# Patient Record
Sex: MALE | Race: BLACK OR AFRICAN AMERICAN | NOT HISPANIC OR LATINO | URBAN - METROPOLITAN AREA
[De-identification: names, ages, dates, MRNs, and addresses within clinical notes are randomized per-mention and may not be internally consistent; named-entity substitution may affect disease eponyms.]

---

## 2022-06-24 ENCOUNTER — EMERGENCY (EMERGENCY)
Facility: HOSPITAL | Age: 57
LOS: 1 days | Discharge: LEFT BEFORE DISCHARGE | End: 2022-06-24
Attending: EMERGENCY MEDICINE | Admitting: EMERGENCY MEDICINE
Payer: OTHER MISCELLANEOUS

## 2022-06-24 VITALS
DIASTOLIC BLOOD PRESSURE: 106 MMHG | RESPIRATION RATE: 18 BRPM | SYSTOLIC BLOOD PRESSURE: 167 MMHG | TEMPERATURE: 98 F | HEART RATE: 88 BPM | OXYGEN SATURATION: 100 %

## 2022-06-24 VITALS
DIASTOLIC BLOOD PRESSURE: 90 MMHG | RESPIRATION RATE: 16 BRPM | OXYGEN SATURATION: 100 % | TEMPERATURE: 98 F | HEART RATE: 86 BPM | SYSTOLIC BLOOD PRESSURE: 158 MMHG

## 2022-06-24 LAB
ALBUMIN SERPL ELPH-MCNC: 4.1 G/DL — SIGNIFICANT CHANGE UP (ref 3.3–5)
ALP SERPL-CCNC: 99 U/L — SIGNIFICANT CHANGE UP (ref 40–120)
ALT FLD-CCNC: 32 U/L — SIGNIFICANT CHANGE UP (ref 4–41)
ANION GAP SERPL CALC-SCNC: 13 MMOL/L — SIGNIFICANT CHANGE UP (ref 7–14)
AST SERPL-CCNC: 67 U/L — HIGH (ref 4–40)
BASE EXCESS BLDV CALC-SCNC: 4.1 MMOL/L — HIGH (ref -2–3)
BILIRUB SERPL-MCNC: 0.7 MG/DL — SIGNIFICANT CHANGE UP (ref 0.2–1.2)
BLOOD GAS VENOUS COMPREHENSIVE RESULT: SIGNIFICANT CHANGE UP
BUN SERPL-MCNC: 7 MG/DL — SIGNIFICANT CHANGE UP (ref 7–23)
CALCIUM SERPL-MCNC: 9.2 MG/DL — SIGNIFICANT CHANGE UP (ref 8.4–10.5)
CHLORIDE BLDV-SCNC: 98 MMOL/L — SIGNIFICANT CHANGE UP (ref 96–108)
CHLORIDE SERPL-SCNC: 96 MMOL/L — LOW (ref 98–107)
CO2 BLDV-SCNC: 30.6 MMOL/L — HIGH (ref 22–26)
CO2 SERPL-SCNC: 26 MMOL/L — SIGNIFICANT CHANGE UP (ref 22–31)
CREAT SERPL-MCNC: 0.64 MG/DL — SIGNIFICANT CHANGE UP (ref 0.5–1.3)
EGFR: 112 ML/MIN/1.73M2 — SIGNIFICANT CHANGE UP
ETHANOL SERPL-MCNC: <10 MG/DL — SIGNIFICANT CHANGE UP
GAS PNL BLDV: 129 MMOL/L — LOW (ref 136–145)
GLUCOSE BLDV-MCNC: 192 MG/DL — HIGH (ref 70–99)
GLUCOSE SERPL-MCNC: 190 MG/DL — HIGH (ref 70–99)
HCO3 BLDV-SCNC: 29 MMOL/L — SIGNIFICANT CHANGE UP (ref 22–29)
HCT VFR BLDA CALC: 53 % — HIGH (ref 39–51)
HGB BLD CALC-MCNC: 17.8 G/DL — HIGH (ref 13–17)
LACTATE BLDV-MCNC: 2.4 MMOL/L — HIGH (ref 0.5–2)
PCO2 BLDV: 44 MMHG — SIGNIFICANT CHANGE UP (ref 42–55)
PH BLDV: 7.43 — SIGNIFICANT CHANGE UP (ref 7.32–7.43)
PO2 BLDV: 42 MMHG — SIGNIFICANT CHANGE UP
POTASSIUM BLDV-SCNC: 3.9 MMOL/L — SIGNIFICANT CHANGE UP (ref 3.5–5.1)
POTASSIUM SERPL-MCNC: 4.2 MMOL/L — SIGNIFICANT CHANGE UP (ref 3.5–5.3)
POTASSIUM SERPL-SCNC: 4.2 MMOL/L — SIGNIFICANT CHANGE UP (ref 3.5–5.3)
PROT SERPL-MCNC: 7.5 G/DL — SIGNIFICANT CHANGE UP (ref 6–8.3)
SAO2 % BLDV: 68.5 % — SIGNIFICANT CHANGE UP
SODIUM SERPL-SCNC: 135 MMOL/L — SIGNIFICANT CHANGE UP (ref 135–145)
TSH SERPL-MCNC: 0.94 UIU/ML — SIGNIFICANT CHANGE UP (ref 0.27–4.2)

## 2022-06-24 PROCEDURE — 70450 CT HEAD/BRAIN W/O DYE: CPT | Mod: 26,MA

## 2022-06-24 PROCEDURE — 99285 EMERGENCY DEPT VISIT HI MDM: CPT

## 2022-06-24 PROCEDURE — 73564 X-RAY EXAM KNEE 4 OR MORE: CPT | Mod: 26,RT

## 2022-06-24 PROCEDURE — 99243 OFF/OP CNSLTJ NEW/EST LOW 30: CPT | Mod: GC

## 2022-06-24 RX ORDER — ACETAMINOPHEN 500 MG
650 TABLET ORAL ONCE
Refills: 0 | Status: COMPLETED | OUTPATIENT
Start: 2022-06-24 | End: 2022-06-24

## 2022-06-24 RX ORDER — IBUPROFEN 200 MG
400 TABLET ORAL ONCE
Refills: 0 | Status: COMPLETED | OUTPATIENT
Start: 2022-06-24 | End: 2022-06-24

## 2022-06-24 RX ADMIN — Medication 650 MILLIGRAM(S): at 13:36

## 2022-06-24 RX ADMIN — Medication 400 MILLIGRAM(S): at 13:36

## 2022-06-24 NOTE — ED PROVIDER NOTE - OBJECTIVE STATEMENT
Pt is a 57 yo M w/unknown past medical hx presenting with R knee injury. Pt hit R knee on door last night with pain. Comes in today with supervisor for injury. Pt appears lethargic but does not endorse any more pain. Pt took 2 motrin and 1 advil for pain. Denies pain, SOB, N.V/D, fever, chills. Pt also denies smoking, alcohol, drugs. Denies any previous surgery or allergies to medications.

## 2022-06-24 NOTE — CONSULT NOTE ADULT - ASSESSMENT
56 year old right-handed man with no known past medical history presents after co-workers reported that he has been more lethargic today and came into work using a cane, which is not normal for him. CT head showed mild ventricular and sulcal prominence, bilateral basal ganglia lucencies appear chronic, likely lacunar infarcts. X-Ray of right knee shows no acute fracture/dislocation. Neurological exam demonstrates slightly wide-based gait and L>R intention tremor. No leukocytosis, lactate 2.4, AST 67/ALT 32.    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 toxic vs. degenerative) in the setting of bilateral intention tremor (left worse than right). For lethargy, patient should undergo screening for CESAR and hypothyroidism if toxic/metabolic workup is negative.    Recommendations:  - Start Aspirin 81mg if patient is not already taking it at home  - Urine tox screen  - TSH, A1C, B1, B6, B12, vitamin D, lipid profile  - Pt states he will follow up with his PCP, Dr. Curtis    Refer pt to follow up with Dr. Michael Nissenbaum for MRI brain:  [address] [place] [address]  [phone] 56 year old right-handed man with no known past medical history presents after co-workers reported that he has been more lethargic today and came into work using a cane, which is not normal for him. CT head showed mild ventricular and sulcal prominence, bilateral basal ganglia lucencies appear chronic, likely lacunar infarcts. X-Ray of right knee shows no acute fracture/dislocation. Neurological exam demonstrates slightly wide-based gait and L>R intention tremor. No leukocytosis, lactate 2.4, AST 67/ALT 32.    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 ETOH use vs. degenerative) in the setting of bilateral intention tremor (left worse than right). For lethargy, patient should undergo screening for CESAR and hypothyroidism if toxic/metabolic workup is negative.    Recommendations:  - Start Aspirin 81mg if patient is not already taking it at home  - Urine tox screen  - TSH, A1C, B1, B6, B12, vitamin D, lipid profile  - Recommend MRI brain with and without contrast  - Pt states he will follow up with his PCP, Dr. Curtis    Refer pt to follow up with Dr. Michael Nissenbaum if he refuses to stay inpatient for further testing.  [address], [place] [id number] [phone]

## 2022-06-24 NOTE — ED ADULT NURSE NOTE - CHIEF COMPLAINT QUOTE
went to work today pt has been lethargic today and was brought from work pt has been taking motrin for right knee pain f/s in field [id number] pt arrives oriented x4 was falling asleep in ambulance on the way to hospital inc of feces today

## 2022-06-24 NOTE — ED PROVIDER NOTE - NSFOLLOWUPINSTRUCTIONS_ED_ALL_ED_FT
--today, the lab tests we did include basic blood tests. imaging tests include CT head. results significant for old strokes, nothing new. we have included these test results in your paperwork. please take them to your follow-up appointment  --given that you were in the ED today, we recommend a followup visit with your general doctor (primary care doctor) within 5 days. you are also leaving against medical advice, we recommend that you return ASAP to complete your medical evaluation.  --your diagnosis is: knee pain, gait instability.

## 2022-06-24 NOTE — ED PROVIDER NOTE - PROGRESS NOTE DETAILS
Elliot Simpson PGY2: Received call from pt's co-worker. They state pt w/ difficulty ambulating today at work, and now requiring a cane as of today. Also lethargic appearing, and unable to write when asked to - just scribbling. CT w/ findings as described. Case discussed with neuro and they will evaluate pt. Recommended admission however pt requesting to return home. Pt signed out to incoming team pending neuro eval, utox, and final dispo. Yulia Portillo MD, PGY-2: pt requesting to leave AMA. pt currently speaking with neurology. pt requests to leave because he needs to assist with care of his sick parents at home. understands risks of leaving, states he will come back tomorrow when someone able to take care of his family. Yulia Portillo MD, PGY-2: pt requesting to leave AMA. pt currently speaking with neurology. pt requests to leave because he needs to assist with care of his sick parents at home. understands risks of leaving, states he will come back tomorrow when someone able to take care of his family. pt understands risks of falls and possible worsening neurological function.

## 2022-06-24 NOTE — CONSULT NOTE ADULT - SUBJECTIVE AND OBJECTIVE BOX
HPI:  56 year old with no known past medical history presents after co-workers reported that he has been more lethargic today, he reports he hit his right knee on door on 6/23 and has been taking NSAIDs for pain control (2 Motrins and 1 Advil). Denies other substance use. He also denies unilateral weakness, sensory loss, speech changes, headache, nausea, vomiting, cough, chest pain, abdominal pain, fevers, chills, fall, head trauma, loss of consciousness. He has been incontinence of urine.    At baseline, patient ambulates independently and completes ADLs without assistance.    MEDICATIONS  (STANDING):    MEDICATIONS  (PRN):    PAST MEDICAL & SURGICAL HISTORY:  No known past medical history    No significant past surgical history      FAMILY HISTORY:    Allergies  No Known Allergies    SHx - No smoking, No ETOH, No drug abuse    Review of Systems:  CONSTITUTIONAL: No fevers, chills  HEENT:  No visual loss, blurred vision, double vision.  No hearing loss, sneezing, congestion, runny nose or sore throat.  SKIN:  No rash or itching.  CARDIOVASCULAR:  No chest pain, chest pressure or chest discomfort. No palpitations or edema.  RESPIRATORY:  No shortness of breath, cough or sputum.  GASTROINTESTINAL:  No anorexia, nausea, vomiting or diarrhea. No abdominal pain.  GENITOURINARY:  No dysuria. No increased frequency. No retention. No incontinence.  NEUROLOGICAL:  See HPI  MUSCULOSKELETAL:  No muscle, back pain, joint pain or stiffness.  HEMATOLOGIC:  No anemia, bleeding or bruising.    Vital Signs Last 24 Hrs  T(C): 36.8 (24 Jun 2022 18:45), Max: 36.8 (24 Jun 2022 18:45)  T(F): 98.3 (24 Jun 2022 18:45), Max: 98.3 (24 Jun 2022 18:45)  HR: 86 (24 Jun 2022 18:45) (82 - 88)  BP: 158/90 (24 Jun 2022 18:45) (147/89 - 167/106)  BP(mean): --  RR: 16 (24 Jun 2022 18:45) (16 - 18)  SpO2: 100% (24 Jun 2022 18:45) (100% - 100%)    PHYSICAL EXAM:  GENERAL: NAD  HEENT: Normocephalic;  conjunctivae and sclerae clear; moist mucous membranes;   NECK: Supple  EXTREMITIES: No cyanosis; no edema; no calf tenderness  SKIN: Warm and dry; no rash             Neurological Exam:  - Mental Status:  AAOx3; speech is fluent with intact naming, repetition, and comprehension  - Cranial Nerves II-XII:    II:  PERRLA; visual fields are full to confrontation  III, IV, VI:  EOMI, no nystagmus  V:  facial sensation is intact in the V1-V3 distribution bilaterally.  VII:  face is symmetric with normal eye closure and smile  VIII:  hearing is intact to finger rub  IX, X:  uvula is midline and soft palate rises symmetrically  XI:  head turning and shoulder shrug are intact bilaterally  XII:  tongue protrudes in the midline  - Motor:  strength is 5/5 throughout; normal muscle bulk and tone throughout; no pronator drift  - Reflexes:  2+ and symmetric at the biceps, triceps, brachioradialis, knees, and ankles;  plantar reflexes downgoing bilaterally  - Sensory:  intact to light touch, pin prick, vibration, and joint-position sense throughout  - Coordination:  finger-nose-finger and heel-knee-shin intact without dysmetria; rapid alternating hand movements intact  - Gait:  normal steps, base, arm swing, and turning; heel and toe walking are normal; tandem gait is normal; Romberg testing is negative    Other:    06-24    135  |  96<L>  |  7   ----------------------------<  190<H>  4.2   |  26  |  0.64    Ca    9.2      24 Jun 2022 13:43    TPro  7.5  /  Alb  4.1  /  TBili  0.7  /  DBili  x   /  AST  67<H>  /  ALT  32  /  AlkPhos  99  06-24                            15.6   9.28  )-----------( 278      ( 24 Jun 2022 13:43 )             47.5       Radiology                HPI:  56 year old right-handed man with no known past medical history presents after co-workers reported that he has been more lethargic today and came into work using a cane, which is not normal for him. Pt denies lethargy and states his nickname is "Sleepy" and he is in his normal state of health. 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 Since then, he has had some pain at the anterior aspect of his right knee, but denies weakness. He has been taking NSAIDs for pain control (2 Motrins and 1 Advil). Denies other substance use. He quit ETOH use in July 2021 on his birthday. Pt also denies unilateral weakness, sensory loss, speech changes, headache, nausea, vomiting, cough, chest pain, abdominal pain, fevers, chills, fall, head trauma, loss of consciousness. He has been incontinent of urine per ED team. When asked more about this, pt says he has the urge to urinate frequently, this has been occurring over the past 1 month. He wet the bed during an ED provider's assessment, which he remembers.     At baseline, patient ambulates independently and completes ADLs without assistance. He lives at home with his father, brother, and daughter (age 12). Pt says he needs to go home tonight because there is no one who can take care of his daughter. His brother takes care of his father.    MEDICATIONS  (STANDING):    MEDICATIONS  (PRN):    PAST MEDICAL & SURGICAL HISTORY:  No known past medical history    No significant past surgical history      FAMILY HISTORY:  Mother-smoker  Father-smoker    Allergies  No Known Allergies    SHx - No smoking, No ETOH, No drug abuse    Review of Systems:  CONSTITUTIONAL: No fevers, chills  HEENT:  No visual loss, blurred vision, double vision.  No hearing loss, sneezing, congestion, runny nose or sore throat.  SKIN:  No rash or itching.  CARDIOVASCULAR:  No chest pain, chest pressure or chest discomfort. No palpitations or edema.  RESPIRATORY:  No shortness of breath, cough or sputum.  GASTROINTESTINAL:  No anorexia, nausea, vomiting or diarrhea. No abdominal pain.  GENITOURINARY:  No dysuria. No increased frequency. No retention. No incontinence.  NEUROLOGICAL:  See HPI  MUSCULOSKELETAL:  +right knee pain. No muscle, back pain, or stiffness.  HEMATOLOGIC:  No anemia, bleeding or bruising.    Vital Signs Last 24 Hrs  T(C): 36.8 (24 Jun 2022 18:45), Max: 36.8 (24 Jun 2022 18:45)  T(F): 98.3 (24 Jun 2022 18:45), Max: 98.3 (24 Jun 2022 18:45)  HR: 86 (24 Jun 2022 18:45) (82 - 88)  BP: 158/90 (24 Jun 2022 18:45) (147/89 - 167/106)  BP(mean): --  RR: 16 (24 Jun 2022 18:45) (16 - 18)  SpO2: 100% (24 Jun 2022 18:45) (100% - 100%)    PHYSICAL EXAM:  GENERAL: Obese man in NAD, disheveled appearance  HEENT: Normocephalic; conjunctivae and slight scleral icterus may be physiologic; proptosis bilaterally, dry mucous membranes  NECK: Supple  EXTREMITIES: No cyanosis; no edema; no calf tenderness  SKIN: Warm and dry; no rash             Neurological Exam:  - Mental Status: Alert, oriented to self, place (knows name of [facility]), month, year, president, has insight into hospital visit, speech is fluent with intact naming, repetition, and comprehension; follows crossed commands; spelled WORLD backwards  - Cranial Nerves II-XII:    II:  PERRL 3mm b/l; visual fields are full to confrontation  III, IV, VI: ?appearance of bilateral ptosis may be due to proptosis, EOMI, no nystagmus  V:  facial sensation is intact in the V1-V3 distribution bilaterally.  VII:  face is symmetric with normal eye closure and smile  VIII:  hearing is intact to finger rub  IX, X:  uvula is midline and soft palate rises symmetrically  XI:  head turning and shoulder shrug are intact bilaterally  XII:  tongue protrudes in the midline  - Motor:  strength is 5/5 throughout; normal muscle bulk and tone throughout; no pronator drift; no involuntary movements or seizure-like activity  - Reflexes:  2+ and symmetric at the biceps, triceps, brachioradialis, 1+ knees, and 2+ ankles;  plantar reflexes downgoing bilaterally  - Sensory:  intact to light touch, pin prick, vibration, and joint-position sense throughout  - Coordination:  finger-nose-finger and heel-knee-shin intact without dysmetria, but left worse than right intention tremor noted   - Gait: slightly wide base and walks with everted feet (when asked why, he said because he has a large abdomen), normal arm swing, Romberg testing is negative    Other:    06-24    135  |  96<L>  |  7   ----------------------------<  190<H>  4.2   |  26  |  0.64    Ca    9.2      24 Jun 2022 13:43    TPro  7.5  /  Alb  4.1  /  TBili  0.7  /  DBili  x   /  AST  67<H>  /  ALT  32  /  AlkPhos  99  06-24                            15.6   9.28  )-----------( 278      ( 24 Jun 2022 13:43 )             47.5       Radiology:  CT Head No Cont (06.24.22 @ 17:20) >  IMPRESSION:  Mild ventricular and sulcal prominence raising the possibility of chronic   illness or certain medications. Bilateral basal ganglia lucencies   suspicious for infarcts of undetermined age. No hemorrhage.              HPI:  56 year old right-handed man with no known past medical history presents after co-workers reported that he has been more lethargic today and came into work using a cane, which is not normal for him. Pt denies lethargy and states his nickname is "Sleepy" and he is in his normal state of health. 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 Since then, he has had some pain at the anterior aspect of his right knee, but denies weakness. He has been taking NSAIDs for pain control (2 Motrins and 1 Advil). Denies other substance use. He quit ETOH use in July 2021 on his birthday. Pt also denies unilateral weakness, sensory loss, speech changes, headache, nausea, vomiting, cough, chest pain, abdominal pain, fevers, chills, fall, head trauma, loss of consciousness. He has been incontinent of urine per ED team. When asked more about this, pt says he has the urge to urinate frequently, this has been occurring over the past 1 month. He wet the bed during an ED provider's assessment, which he remembers.     At baseline, patient ambulates independently and completes ADLs without assistance. He lives at home with his father, brother, and daughter (age 12). Pt says he needs to go home tonight because there is no one who can take care of his daughter. His brother takes care of his father.    MEDICATIONS  (STANDING):    MEDICATIONS  (PRN):    PAST MEDICAL & SURGICAL HISTORY:  No known past medical history    No significant past surgical history    FAMILY HISTORY:  Mother-smoker  Father-smoker    Allergies  No Known Allergies    SHx - non-smoker, no illicit drug use, prior ETOH use (quit July 2021)    Review of Systems:  CONSTITUTIONAL: No fevers, chills  HEENT:  No visual loss, blurred vision, double vision.  No hearing loss, sneezing, congestion, runny nose or sore throat.  SKIN:  No rash or itching.  CARDIOVASCULAR:  No chest pain, chest pressure or chest discomfort. No palpitations or edema.  RESPIRATORY:  No shortness of breath, cough or sputum.  GASTROINTESTINAL:  No anorexia, nausea, vomiting or diarrhea. No abdominal pain.  GENITOURINARY:  No dysuria. No increased frequency. No retention. No incontinence.  NEUROLOGICAL:  See HPI  MUSCULOSKELETAL:  +right knee pain. No muscle, back pain, or stiffness.  HEMATOLOGIC:  No anemia, bleeding or bruising.    Vital Signs Last 24 Hrs  T(C): 36.8 (24 Jun 2022 18:45), Max: 36.8 (24 Jun 2022 18:45)  T(F): 98.3 (24 Jun 2022 18:45), Max: 98.3 (24 Jun 2022 18:45)  HR: 86 (24 Jun 2022 18:45) (82 - 88)  BP: 158/90 (24 Jun 2022 18:45) (147/89 - 167/106)  BP(mean): --  RR: 16 (24 Jun 2022 18:45) (16 - 18)  SpO2: 100% (24 Jun 2022 18:45) (100% - 100%)    PHYSICAL EXAM:  GENERAL: Obese man in NAD, disheveled appearance  HEENT: Normocephalic; conjunctivae and slight scleral icterus may be physiologic; proptosis bilaterally, dry mucous membranes  NECK: Supple  EXTREMITIES: No cyanosis; no edema; no calf tenderness  SKIN: Warm and dry; no rash             Neurological Exam:  - Mental Status: Alert, oriented to self, place (knows name of hospital), month, year, president, has insight into hospital visit, speech is fluent with intact naming, repetition, and comprehension; follows crossed commands; spelled WORLD backwards  - Cranial Nerves II-XII:    II:  PERRL 3mm b/l; visual fields are full to confrontation  III, IV, VI: ?appearance of bilateral ptosis may be due to proptosis, EOMI, no nystagmus  V:  facial sensation is intact in the V1-V3 distribution bilaterally.  VII:  face is symmetric with normal eye closure and smile  VIII:  hearing is intact to finger rub  IX, X:  uvula is midline and soft palate rises symmetrically  XI:  head turning and shoulder shrug are intact bilaterally  XII:  tongue protrudes in the midline  - Motor:  strength is 5/5 throughout; normal muscle bulk and tone throughout; no pronator drift; no involuntary movements or seizure-like activity  - Reflexes:  2+ and symmetric at the biceps, triceps, brachioradialis, 1+ knees, and 2+ ankles;  plantar reflexes downgoing bilaterally  - Sensory:  intact to light touch, pin prick, vibration, and joint-position sense throughout  - Coordination:  finger-nose-finger and heel-knee-shin intact without dysmetria, but left worse than right intention tremor noted   - Gait: slightly wide base and walks with everted feet (when asked why, he said because he has a large abdomen), normal arm swing, Romberg testing is negative    Other:    06-24    135  |  96<L>  |  7   ----------------------------<  190<H>  4.2   |  26  |  0.64    Ca    9.2      24 Jun 2022 13:43    TPro  7.5  /  Alb  4.1  /  TBili  0.7  /  DBili  x   /  AST  67<H>  /  ALT  32  /  AlkPhos  99  06-24                            15.6   9.28  )-----------( 278      ( 24 Jun 2022 13:43 )             47.5       Radiology:  CT Head No Cont (06.24.22 @ 17:20) >  IMPRESSION:  Mild ventricular and sulcal prominence raising the possibility of chronic   illness or certain medications. Bilateral basal ganglia lucencies   suspicious for infarcts of undetermined age. No hemorrhage.              HPI:  56 year old right-handed man with no known past medical history presents after co-workers reported that he has been more lethargic today and came into work using a cane, which is not normal for him. Pt denies lethargy and states his nickname is "Sleepy" and he is in his normal state of health. 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 Since then, he has had some pain at the anterior aspect of his right knee, but denies weakness. He has been taking NSAIDs for pain control (2 Motrins and 1 Advil). Denies other substance use. He quit ETOH use in July 2021 on his birthday. Pt also denies unilateral weakness, sensory loss, speech changes, headache, nausea, vomiting, cough, chest pain, abdominal pain, fevers, chills, fall, head trauma, loss of consciousness. He has been incontinent of urine per ED team. When asked more about this, pt says he has the urge to urinate frequently, this has been occurring over the past 1 month. He wet the bed during an ED provider's assessment, which he remembers.     At baseline, patient ambulates independently and completes ADLs without assistance. He lives at home with his father, brother, and daughter (age 12). Pt says he needs to go home tonight because there is no one who can take care of his daughter. His brother takes care of his father.    MEDICATIONS  (STANDING):    MEDICATIONS  (PRN):    PAST MEDICAL & SURGICAL HISTORY:  No known past medical history    No significant past surgical history    FAMILY HISTORY:  Mother-smoker  Father-smoker    Allergies  No Known Allergies    SHx - non-smoker, no illicit drug use, prior ETOH use (quit July 2021)    Review of Systems:  CONSTITUTIONAL: No fevers, chills  HEENT:  No visual loss, blurred vision, double vision.  No hearing loss, sneezing, congestion, runny nose or sore throat.  SKIN:  No rash or itching.  CARDIOVASCULAR:  No chest pain, chest pressure or chest discomfort. No palpitations or edema.  RESPIRATORY:  No shortness of breath, cough or sputum.  GASTROINTESTINAL:  No anorexia, nausea, vomiting or diarrhea. No abdominal pain.  GENITOURINARY:  No dysuria. + increased frequency, + urge incontinence.  NEUROLOGICAL:  See HPI  MUSCULOSKELETAL:  +right knee pain. No muscle, back pain, or stiffness.  HEMATOLOGIC:  No anemia, bleeding or bruising.    Vital Signs Last 24 Hrs  T(C): 36.8 (24 Jun 2022 18:45), Max: 36.8 (24 Jun 2022 18:45)  T(F): 98.3 (24 Jun 2022 18:45), Max: 98.3 (24 Jun 2022 18:45)  HR: 86 (24 Jun 2022 18:45) (82 - 88)  BP: 158/90 (24 Jun 2022 18:45) (147/89 - 167/106)  BP(mean): --  RR: 16 (24 Jun 2022 18:45) (16 - 18)  SpO2: 100% (24 Jun 2022 18:45) (100% - 100%)    PHYSICAL EXAM:  GENERAL: Obese man in NAD, disheveled appearance  HEENT: Normocephalic; conjunctivae and slight scleral icterus may be physiologic; proptosis bilaterally, dry mucous membranes  NECK: Supple  EXTREMITIES: No cyanosis; no edema; no calf tenderness  SKIN: Warm and dry; no rash             Neurological Exam:  - Mental Status: Alert, oriented to self, place (knows name of hospital), month, year, president, has insight into hospital visit, speech is fluent with intact naming, repetition, and comprehension; follows crossed commands; spelled WORLD backwards  - Cranial Nerves II-XII:    II:  PERRL 3mm b/l; visual fields are full to confrontation  III, IV, VI: ?appearance of bilateral ptosis may be due to proptosis, EOMI, no nystagmus  V:  facial sensation is intact in the V1-V3 distribution bilaterally.  VII:  face is symmetric with normal eye closure and smile  VIII:  hearing is intact to finger rub  IX, X:  uvula is midline and soft palate rises symmetrically  XI:  head turning and shoulder shrug are intact bilaterally  XII:  tongue protrudes in the midline  - Motor:  strength is 5/5 throughout; normal muscle bulk and tone throughout; no pronator drift; no involuntary movements or seizure-like activity  - Reflexes:  2+ and symmetric at the biceps, triceps, brachioradialis, 1+ knees, and 2+ ankles;  plantar reflexes downgoing bilaterally  - Sensory:  intact to light touch, pin prick, vibration, and joint-position sense throughout  - Coordination:  finger-nose-finger and heel-knee-shin intact without dysmetria, but left worse than right intention tremor noted   - Gait: slightly wide base and walks with everted feet (when asked why, he said because he has a large abdomen), normal arm swing, Romberg testing is negative    Other:    06-24    135  |  96<L>  |  7   ----------------------------<  190<H>  4.2   |  26  |  0.64    Ca    9.2      24 Jun 2022 13:43    TPro  7.5  /  Alb  4.1  /  TBili  0.7  /  DBili  x   /  AST  67<H>  /  ALT  32  /  AlkPhos  99  06-24                            15.6   9.28  )-----------( 278      ( 24 Jun 2022 13:43 )             47.5       Radiology:  CT Head No Cont (06.24.22 @ 17:20) >  IMPRESSION:  Mild ventricular and sulcal prominence raising the possibility of chronic   illness or certain medications. Bilateral basal ganglia lucencies   suspicious for infarcts of undetermined age. No hemorrhage.

## 2022-06-24 NOTE — ED PROVIDER NOTE - PATIENT PORTAL LINK FT
You can access the FollowMyHealth Patient Portal offered by [facility] by registering at the following website: [email or web address]. By joining [employer]’s FollowMyHealth portal, you will also be able to view your health information using other applications (apps) compatible with our system.

## 2022-06-24 NOTE — ED ADULT NURSE NOTE - OBJECTIVE STATEMENT
A&Ox4. ambulatory c/o lethargic today and knee pain. NAD. pt denies SOB, chest pain, dizziness, weakness, urinary symptoms, HA, n/v/d, fevers, chills. respirations are even and un labored. abd is soft and non tender. skin intact. safety precautions maintained. awaiting orders. call bell at bedside. A&Ox4. ambulatory c/o lethargic today and right knee pain. NAD. pt denies SOB, chest pain, dizziness, weakness, urinary symptoms, HA, n/v/d, fevers, chills. respirations are even and un labored. scleras are yellow in color in left and right eye. pt states he is "always like this." abd is distended and non tender. skin intact. safety precautions maintained. awaiting orders. call bell at bedside.

## 2022-06-24 NOTE — ED PROVIDER NOTE - CLINICAL SUMMARY MEDICAL DECISION MAKING FREE TEXT BOX
rody: pt with right knee pain after hitting it into a door at work.  hx htn, on amlodipine, losartan and metoprolol.  pt without any previous surgery on the knee.  will xray and give pain meds rody: pt with right knee pain after hitting it into a door at work.  hx htn, on amlodipine, losartan and metoprolol.  pt without any previous surgery on the knee.  will xray and give pain meds.  note new dx dm, pt with incontinence.  seems "off" will involve neuro and get head ct.  pt signed out at the end of my shift

## 2022-06-24 NOTE — ED ADULT TRIAGE NOTE - CHIEF COMPLAINT QUOTE
went to work today pt has been lethargic today and was brought from work pt has been taking motrin for right knee pain f/s in field 218 pt arrives A&ox4 was falling asleep in ambulance on the way to hospital went to work today pt has been lethargic today and was brought from work pt has been taking motrin for right knee pain f/s in field 218 pt arrives A&ox4 was falling asleep in ambulance on the way to hospital inc of feces today went to work today pt has been lethargic today and was brought from work pt has been taking motrin for right knee pain f/s in field 218 pt arrives oriented x4 was falling asleep in ambulance on the way to hospital inc of feces today

## 2022-06-24 NOTE — ED PROVIDER NOTE - PHYSICAL EXAMINATION
Gen: A&Ox4   HEENT: Atraumatic. Mucous membranes moist, question of scleral icterus. Eyes mildly proptotic.   CV: RRR. No significant lower extremity edema.   Resp: Respirations unlabored. CTAB, no rales, no wheezes.  GI: Abdomen non tender to palpation, soft and non-distended.   Skin/MSK: No open wounds. No ecchymosis appreciated. Bruise over right anterior knee. ROM intact at right knee w/ mild pain. Minimal ttp anteriorly over right knee.   Neuro: Following commands. EOMI. Pupils ERRL. CN2-12 grossly intact. Motor strength +5/5 throughout upper and lower extremities. Sensation intact throughout upper and lower extremities. Mild tremors bilat w/ hands extended. Finger to nose smooth and coordinated on right, slight dysmetria on left. + rhomberg. No pronator drift. Gait mildly unstable w/ falling to right - question whether 2/2 to pain.  Psych: Appropriate mood, cooperative

## 2022-06-24 NOTE — ED PROVIDER NOTE - CARE PLAN
1 Principal Discharge DX:	Right knee pain   Principal Discharge DX:	Right knee pain  Secondary Diagnosis:	Gait instability

## 2022-06-24 NOTE — ED PROVIDER NOTE - ATTENDING CONTRIBUTION TO CARE
rody: pt with right knee pain after hitting it into a door at work.  hx htn, on amlodipine, losartan and metoprolol.  pt without any previous surgery on the knee.  will xray and give pain meds    I performed a history and physical exam of the patient and discussed their management with the resident and /or advanced care provider. I reviewed the resident and /or ACP's note and agree with the documented findings and plan of care. My medical decison making and observations are found above.

## 2022-06-28 ENCOUNTER — INPATIENT (INPATIENT)
Facility: HOSPITAL | Age: 57
LOS: 1 days | Discharge: HOME HEALTH | End: 2022-06-30
Attending: HOSPITALIST | Admitting: HOSPITALIST
Payer: COMMERCIAL

## 2022-06-28 VITALS
DIASTOLIC BLOOD PRESSURE: 83 MMHG | SYSTOLIC BLOOD PRESSURE: 144 MMHG | OXYGEN SATURATION: 99 % | RESPIRATION RATE: 16 BRPM | HEART RATE: 105 BPM | TEMPERATURE: 98 F

## 2022-06-28 LAB
A1C WITH ESTIMATED AVERAGE GLUCOSE RESULT: 8.2 % — HIGH (ref 4–5.6)
ALBUMIN SERPL ELPH-MCNC: 4 G/DL — SIGNIFICANT CHANGE UP (ref 3.3–5)
ALP SERPL-CCNC: 99 U/L — SIGNIFICANT CHANGE UP (ref 40–120)
ALT FLD-CCNC: 34 U/L — SIGNIFICANT CHANGE UP (ref 4–41)
ANION GAP SERPL CALC-SCNC: 19 MMOL/L — HIGH (ref 7–14)
APTT BLD: 30 SEC — SIGNIFICANT CHANGE UP (ref 27–36.3)
AST SERPL-CCNC: 36 U/L — SIGNIFICANT CHANGE UP (ref 4–40)
BASOPHILS # BLD AUTO: 0.12 K/UL — SIGNIFICANT CHANGE UP (ref 0–0.2)
BASOPHILS NFR BLD AUTO: 1.8 % — SIGNIFICANT CHANGE UP (ref 0–2)
BILIRUB SERPL-MCNC: 0.8 MG/DL — SIGNIFICANT CHANGE UP (ref 0.2–1.2)
BUN SERPL-MCNC: 8 MG/DL — SIGNIFICANT CHANGE UP (ref 7–23)
CALCIUM SERPL-MCNC: 9.4 MG/DL — SIGNIFICANT CHANGE UP (ref 8.4–10.5)
CHLORIDE SERPL-SCNC: 94 MMOL/L — LOW (ref 98–107)
CHOLEST SERPL-MCNC: 186 MG/DL — SIGNIFICANT CHANGE UP
CO2 SERPL-SCNC: 21 MMOL/L — LOW (ref 22–31)
CREAT SERPL-MCNC: 0.6 MG/DL — SIGNIFICANT CHANGE UP (ref 0.5–1.3)
EGFR: 115 ML/MIN/1.73M2 — SIGNIFICANT CHANGE UP
EOSINOPHIL # BLD AUTO: 0.2 K/UL — SIGNIFICANT CHANGE UP (ref 0–0.5)
EOSINOPHIL NFR BLD AUTO: 3 % — SIGNIFICANT CHANGE UP (ref 0–6)
ESTIMATED AVERAGE GLUCOSE: 189 — SIGNIFICANT CHANGE UP
GLUCOSE SERPL-MCNC: 162 MG/DL — HIGH (ref 70–99)
HCT VFR BLD CALC: 47.9 % — SIGNIFICANT CHANGE UP (ref 39–50)
HDLC SERPL-MCNC: 34 MG/DL — LOW
HGB BLD-MCNC: 16.3 G/DL — SIGNIFICANT CHANGE UP (ref 13–17)
IANC: 3.71 K/UL — SIGNIFICANT CHANGE UP (ref 1.8–7.4)
IMM GRANULOCYTES NFR BLD AUTO: 0.6 % — SIGNIFICANT CHANGE UP (ref 0–1.5)
INR BLD: 1.04 RATIO — SIGNIFICANT CHANGE UP (ref 0.88–1.16)
LIPID PNL WITH DIRECT LDL SERPL: 131 MG/DL — HIGH
LYMPHOCYTES # BLD AUTO: 1.96 K/UL — SIGNIFICANT CHANGE UP (ref 1–3.3)
LYMPHOCYTES # BLD AUTO: 29.2 % — SIGNIFICANT CHANGE UP (ref 13–44)
MCHC RBC-ENTMCNC: 28.4 PG — SIGNIFICANT CHANGE UP (ref 27–34)
MCHC RBC-ENTMCNC: 34 GM/DL — SIGNIFICANT CHANGE UP (ref 32–36)
MCV RBC AUTO: 83.4 FL — SIGNIFICANT CHANGE UP (ref 80–100)
MONOCYTES # BLD AUTO: 0.69 K/UL — SIGNIFICANT CHANGE UP (ref 0–0.9)
MONOCYTES NFR BLD AUTO: 10.3 % — SIGNIFICANT CHANGE UP (ref 2–14)
NEUTROPHILS # BLD AUTO: 3.71 K/UL — SIGNIFICANT CHANGE UP (ref 1.8–7.4)
NEUTROPHILS NFR BLD AUTO: 55.1 % — SIGNIFICANT CHANGE UP (ref 43–77)
NON HDL CHOLESTEROL: 152 MG/DL — HIGH
NRBC # BLD: 0 /100 WBCS — SIGNIFICANT CHANGE UP
NRBC # FLD: 0 K/UL — SIGNIFICANT CHANGE UP
PCP SPEC-MCNC: SIGNIFICANT CHANGE UP
PLATELET # BLD AUTO: 254 K/UL — SIGNIFICANT CHANGE UP (ref 150–400)
POTASSIUM SERPL-MCNC: 3.4 MMOL/L — LOW (ref 3.5–5.3)
POTASSIUM SERPL-SCNC: 3.4 MMOL/L — LOW (ref 3.5–5.3)
PROT SERPL-MCNC: 7.6 G/DL — SIGNIFICANT CHANGE UP (ref 6–8.3)
PROTHROM AB SERPL-ACNC: 12.1 SEC — SIGNIFICANT CHANGE UP (ref 10.5–13.4)
RBC # BLD: 5.74 M/UL — SIGNIFICANT CHANGE UP (ref 4.2–5.8)
RBC # FLD: 13.8 % — SIGNIFICANT CHANGE UP (ref 10.3–14.5)
SODIUM SERPL-SCNC: 134 MMOL/L — LOW (ref 135–145)
T4 AB SER-ACNC: 8.64 UG/DL — SIGNIFICANT CHANGE UP (ref 5.1–13)
TRIGL SERPL-MCNC: 104 MG/DL — SIGNIFICANT CHANGE UP
TSH SERPL-MCNC: 0.89 UIU/ML — SIGNIFICANT CHANGE UP (ref 0.27–4.2)
VIT B12 SERPL-MCNC: 895 PG/ML — SIGNIFICANT CHANGE UP (ref 200–900)
WBC # BLD: 6.72 K/UL — SIGNIFICANT CHANGE UP (ref 3.8–10.5)
WBC # FLD AUTO: 6.72 K/UL — SIGNIFICANT CHANGE UP (ref 3.8–10.5)

## 2022-06-28 PROCEDURE — 99285 EMERGENCY DEPT VISIT HI MDM: CPT

## 2022-06-28 NOTE — ED PROVIDER NOTE - OBJECTIVE STATEMENT
56M w/ h/o HTN, HLD, ETOH use, presents with confusion and R knee pain. Patient previously, seen in this ED on 6/24 where he was evaluated for his confusion by neuro who recommended admission for work-up uncluding MRI. Pt AMA'd at that time. Returning today, still complaining of confusion and right knee pain. Pt admits to drinking 2 cans of beer today.  Denies other substance use. Denies headache, fever/chills, abd pain, N/V

## 2022-06-28 NOTE — ED ADULT NURSE NOTE - CHIEF COMPLAINT QUOTE
cousin states " he is not acting him self  since yesterday with some confusion yesterday and getting worst " also states he drinks alcohol  daily but this is new since yesterday. unable to walk . speech is slurred . patient admits to drinking beers today and patient urinated on him self now. seen by MD .no code stroke called.

## 2022-06-28 NOTE — ED ADULT NURSE NOTE - OBJECTIVE STATEMENT
Pt presents to  12 AO4 amublatory short distances with assistance, presenting for R knee pain. Pt endorses falling on Friday and experiencing knee pain since then. Pt was recently seen here at [facility] for same complaint, at time had XR with (-) findings, however at time was told he had multiple old strokes and was to be admitted for MRI. Pt left AMA. Pt also endorses some confusion, but is AOx4, only confusion is about why his knee hurts as per him. 20g IV placed to R AC, labs drawn and sent. Denies any chest pain, SOB, or diff breathing. Appears to be resting comfortably at this time, placed on cardiac monitor found in NSR.

## 2022-06-28 NOTE — ED PROVIDER NOTE - ATTENDING CONTRIBUTION TO CARE
Patient is a 55 yo M with no known medical problems, seen here on 6/24/22 for lethargy and right knee pain, found to have an abnormal CT Head which showed: "Mild ventricular and sulcal prominence raising the possibility of chronic illness or certain medications. Bilateral basal ganglia lucencies suspicious for infarcts of undetermined age. No hemorrhage." now here for feeling confused. Patient is unclear as to what happened but now states he feels confused about why his knee hurts. He was seen by neurology who recommended he get additional testing including MRI Brain. Patient left AMA on 6/24/22. He is asking to stay now for MRI. He denies any headache, chest pain, shortness of breath, abdominal pain. He did have an accident on himself and urinated. He denies dysuria, vomiting, diarrhea. No back pain. Denies lower extremity weakness. Patient admits to drinking 2 beers today. Denies any history of seizures or withdrawal symptoms. Denies substance use. He does smoke daily.     On June 24th, patient was noted to be using cane and reported he had hit his knee the day before on an EXIT door.     At baseline, patient ambulates independently and completes ADLs without assistance. He left because he wanted to go home to take care of his daughter.     VS noted  Gen. no acute distress, Non toxic   HEENT: EOMI, mmm, atraumatic  Spine: No C-T-L spine tenderness  Lungs: CTAB/L no C/ W /R   CVS: RRR   Abd; Soft non tender, non distended   Ext: no edema  Skin: no rash  Neuro AAOx3, CN 2-12 intact, strength equal in UE/LE, EHL 5/5 bilaterally, clear speech  a/p: confusion/ abnormal CT Head - possible strokes - plan for labs, likely CDU vs admission for MRI/ neuro evaluation.   - Melinda BUCKNER Patient is a 55 yo M with no known medical problems, seen here on 6/24/22 for lethargy and right knee pain, found to have an abnormal CT Head which showed: "Mild ventricular and sulcal prominence raising the possibility of chronic illness or certain medications. Bilateral basal ganglia lucencies suspicious for infarcts of undetermined age. No hemorrhage." now here for feeling confused. Patient is unclear as to what happened but now states he feels confused about why his knee hurts. He was seen by neurology who recommended he get additional testing including MRI Brain. Patient left AMA on 6/24/22. He is asking to stay now for MRI. He denies any headache, chest pain, shortness of breath, abdominal pain. He did have an accident on himself and urinated. He denies dysuria, vomiting, diarrhea. No back pain. Denies lower extremity weakness. Patient admits to drinking 2 beers today. Denies any history of seizures or withdrawal symptoms. Denies substance use. He does smoke daily.     On June 24th, patient was noted to be using cane and reported he had hit his knee the day before on an EXIT door.     At baseline, patient ambulates independently and completes ADLs without assistance. He left because he wanted to go home to take care of his daughter.     VS noted  Gen. no acute distress, Non toxic   HEENT: EOMI, mmm, atraumatic  Spine: No C-T-L spine tenderness  Lungs: CTAB/L no C/ W /R   CVS: RRR   Abd; Soft non tender, non distended   Ext: no edema  Skin: no rash  Neuro AAOx3, CN 2-12 intact, strength equal in UE/LE, EHL 5/5 bilaterally, clear speech  a/p: confusion/ abnormal CT Head - possible strokes - plan for labs, likely admission for MRI/ neuro evaluation.   - Melinda BUCKNER

## 2022-06-28 NOTE — ED PROVIDER NOTE - PHYSICAL EXAMINATION
GENERAL: well appearing in no acute distress, non-toxic appearing  HEAD: normocephalic, atraumatic  HEENT: normal conjunctiva, oral mucosa moist, uvula midline, no tonsilar exudates, no JVD  CARDIAC: regular rate and rhythm, normal S1S2, no appreciable murmurs, 2+ pulses in UE/LE b/l  PULM: normal breath sounds, clear to ascultation bilaterally, no rales, rhonchi, wheezing  GI: Abd soft, nondistended, nontender, no rebound tenderness, no guarding, no rigidity  : no CVA tenderness b/l, no suprapubic tenderness  NEURO: no focal motor or sensory deficits, normal speech, normal gait, AAOx3  MSK: ROM intact, no peripheral edema  SKIN: well-perfused, extremities warm, no visible rashes  PSYCH: appropriate mood and affect  GYN: No blood in vaginal vault, normal cervix, no discharge, no CMT.

## 2022-06-28 NOTE — ED ADULT TRIAGE NOTE - CHIEF COMPLAINT QUOTE
cousin states " he is not acting him self  since yesterday with some confusion yesterday and getting worst " also states he drinks alcohol  daily but this is new since yesterday. unable to walk . speech is slurred . cousin states " he is not acting him self  since yesterday with some confusion yesterday and getting worst " also states he drinks alcohol  daily but this is new since yesterday. unable to walk . speech is slurred . patient admits to drinking beers today and patient urinated on him self now. seen by MD .no code stroke called.

## 2022-06-28 NOTE — ED PROVIDER NOTE - CLINICAL SUMMARY MEDICAL DECISION MAKING FREE TEXT BOX
Melinda BUCKNER: p/w confusion/ abnormal CT Head on 6/24/22 - possible strokes - plan for labs, likely admission for MRI/ neuro evaluation.

## 2022-06-29 DIAGNOSIS — E11.9 TYPE 2 DIABETES MELLITUS WITHOUT COMPLICATIONS: ICD-10-CM

## 2022-06-29 DIAGNOSIS — I10 ESSENTIAL (PRIMARY) HYPERTENSION: ICD-10-CM

## 2022-06-29 DIAGNOSIS — F05 DELIRIUM DUE TO KNOWN PHYSIOLOGICAL CONDITION: ICD-10-CM

## 2022-06-29 DIAGNOSIS — R41.82 ALTERED MENTAL STATUS, UNSPECIFIED: ICD-10-CM

## 2022-06-29 DIAGNOSIS — R26.9 UNSPECIFIED ABNORMALITIES OF GAIT AND MOBILITY: ICD-10-CM

## 2022-06-29 DIAGNOSIS — I63.9 CEREBRAL INFARCTION, UNSPECIFIED: ICD-10-CM

## 2022-06-29 DIAGNOSIS — M25.569 PAIN IN UNSPECIFIED KNEE: ICD-10-CM

## 2022-06-29 LAB
ETHANOL SERPL-MCNC: 29 MG/DL — HIGH
GLUCOSE BLDC GLUCOMTR-MCNC: 143 MG/DL — HIGH (ref 70–99)
MAGNESIUM SERPL-MCNC: 2.2 MG/DL — SIGNIFICANT CHANGE UP (ref 1.6–2.6)
SARS-COV-2 RNA SPEC QL NAA+PROBE: SIGNIFICANT CHANGE UP

## 2022-06-29 PROCEDURE — 70496 CT ANGIOGRAPHY HEAD: CPT | Mod: 26

## 2022-06-29 PROCEDURE — 99223 1ST HOSP IP/OBS HIGH 75: CPT

## 2022-06-29 PROCEDURE — 70553 MRI BRAIN STEM W/O & W/DYE: CPT | Mod: 26

## 2022-06-29 PROCEDURE — 70498 CT ANGIOGRAPHY NECK: CPT | Mod: 26

## 2022-06-29 PROCEDURE — 12345: CPT | Mod: NC

## 2022-06-29 RX ORDER — LANOLIN ALCOHOL/MO/W.PET/CERES
3 CREAM (GRAM) TOPICAL AT BEDTIME
Refills: 0 | Status: DISCONTINUED | OUTPATIENT
Start: 2022-06-29 | End: 2022-06-30

## 2022-06-29 RX ORDER — ACETAMINOPHEN 500 MG
650 TABLET ORAL EVERY 6 HOURS
Refills: 0 | Status: DISCONTINUED | OUTPATIENT
Start: 2022-06-29 | End: 2022-06-30

## 2022-06-29 RX ORDER — INSULIN LISPRO 100/ML
VIAL (ML) SUBCUTANEOUS AT BEDTIME
Refills: 0 | Status: DISCONTINUED | OUTPATIENT
Start: 2022-06-29 | End: 2022-06-30

## 2022-06-29 RX ORDER — ENOXAPARIN SODIUM 100 MG/ML
40 INJECTION SUBCUTANEOUS EVERY 24 HOURS
Refills: 0 | Status: DISCONTINUED | OUTPATIENT
Start: 2022-06-29 | End: 2022-06-30

## 2022-06-29 RX ORDER — VALSARTAN 80 MG/1
160 TABLET ORAL DAILY
Refills: 0 | Status: DISCONTINUED | OUTPATIENT
Start: 2022-06-29 | End: 2022-06-30

## 2022-06-29 RX ORDER — METOPROLOL TARTRATE 50 MG
200 TABLET ORAL DAILY
Refills: 0 | Status: DISCONTINUED | OUTPATIENT
Start: 2022-06-29 | End: 2022-06-30

## 2022-06-29 RX ORDER — ASPIRIN/CALCIUM CARB/MAGNESIUM 324 MG
81 TABLET ORAL DAILY
Refills: 0 | Status: DISCONTINUED | OUTPATIENT
Start: 2022-06-29 | End: 2022-06-30

## 2022-06-29 RX ORDER — DEXTROSE 50 % IN WATER 50 %
25 SYRINGE (ML) INTRAVENOUS ONCE
Refills: 0 | Status: DISCONTINUED | OUTPATIENT
Start: 2022-06-29 | End: 2022-06-30

## 2022-06-29 RX ORDER — SODIUM CHLORIDE 9 MG/ML
1000 INJECTION, SOLUTION INTRAVENOUS
Refills: 0 | Status: DISCONTINUED | OUTPATIENT
Start: 2022-06-29 | End: 2022-06-30

## 2022-06-29 RX ORDER — CLOPIDOGREL BISULFATE 75 MG/1
75 TABLET, FILM COATED ORAL DAILY
Refills: 0 | Status: DISCONTINUED | OUTPATIENT
Start: 2022-06-30 | End: 2022-06-30

## 2022-06-29 RX ORDER — POTASSIUM CHLORIDE 20 MEQ
40 PACKET (EA) ORAL ONCE
Refills: 0 | Status: COMPLETED | OUTPATIENT
Start: 2022-06-29 | End: 2022-06-29

## 2022-06-29 RX ORDER — ONDANSETRON 8 MG/1
4 TABLET, FILM COATED ORAL EVERY 8 HOURS
Refills: 0 | Status: DISCONTINUED | OUTPATIENT
Start: 2022-06-29 | End: 2022-06-30

## 2022-06-29 RX ORDER — DEXTROSE 50 % IN WATER 50 %
12.5 SYRINGE (ML) INTRAVENOUS ONCE
Refills: 0 | Status: DISCONTINUED | OUTPATIENT
Start: 2022-06-29 | End: 2022-06-30

## 2022-06-29 RX ORDER — CLOPIDOGREL BISULFATE 75 MG/1
300 TABLET, FILM COATED ORAL ONCE
Refills: 0 | Status: COMPLETED | OUTPATIENT
Start: 2022-06-29 | End: 2022-06-29

## 2022-06-29 RX ORDER — INSULIN LISPRO 100/ML
VIAL (ML) SUBCUTANEOUS
Refills: 0 | Status: DISCONTINUED | OUTPATIENT
Start: 2022-06-29 | End: 2022-06-30

## 2022-06-29 RX ORDER — DEXTROSE 50 % IN WATER 50 %
15 SYRINGE (ML) INTRAVENOUS ONCE
Refills: 0 | Status: DISCONTINUED | OUTPATIENT
Start: 2022-06-29 | End: 2022-06-30

## 2022-06-29 RX ORDER — GLUCAGON INJECTION, SOLUTION 0.5 MG/.1ML
1 INJECTION, SOLUTION SUBCUTANEOUS ONCE
Refills: 0 | Status: DISCONTINUED | OUTPATIENT
Start: 2022-06-29 | End: 2022-06-30

## 2022-06-29 RX ORDER — ATORVASTATIN CALCIUM 80 MG/1
40 TABLET, FILM COATED ORAL AT BEDTIME
Refills: 0 | Status: DISCONTINUED | OUTPATIENT
Start: 2022-06-29 | End: 2022-06-29

## 2022-06-29 RX ORDER — HYDROCHLOROTHIAZIDE 25 MG
25 TABLET ORAL DAILY
Refills: 0 | Status: DISCONTINUED | OUTPATIENT
Start: 2022-06-29 | End: 2022-06-30

## 2022-06-29 RX ORDER — ATORVASTATIN CALCIUM 80 MG/1
80 TABLET, FILM COATED ORAL AT BEDTIME
Refills: 0 | Status: DISCONTINUED | OUTPATIENT
Start: 2022-06-29 | End: 2022-06-30

## 2022-06-29 RX ADMIN — Medication 25 MILLIGRAM(S): at 06:55

## 2022-06-29 RX ADMIN — VALSARTAN 160 MILLIGRAM(S): 80 TABLET ORAL at 06:54

## 2022-06-29 RX ADMIN — ENOXAPARIN SODIUM 40 MILLIGRAM(S): 100 INJECTION SUBCUTANEOUS at 14:15

## 2022-06-29 RX ADMIN — CLOPIDOGREL BISULFATE 300 MILLIGRAM(S): 75 TABLET, FILM COATED ORAL at 14:15

## 2022-06-29 RX ADMIN — Medication 81 MILLIGRAM(S): at 14:16

## 2022-06-29 RX ADMIN — Medication 40 MILLIEQUIVALENT(S): at 03:20

## 2022-06-29 RX ADMIN — ATORVASTATIN CALCIUM 80 MILLIGRAM(S): 80 TABLET, FILM COATED ORAL at 21:12

## 2022-06-29 RX ADMIN — Medication 200 MILLIGRAM(S): at 06:54

## 2022-06-29 NOTE — ED ADULT NURSE REASSESSMENT NOTE - NS ED NURSE REASSESS COMMENT FT1
Pt Brother Inna Howard  called for checkup. Pt gave OK to speak to brother. Brother left phone number for updates- [phone]

## 2022-06-29 NOTE — H&P ADULT - PROBLEM/PLAN-4
Follow-up Pulm Progress Note    No new respiratory events overnight.  Denies SOB/CP.   Tolerating CPAP well today   OOB to chair     Medications:  MEDICATIONS  (STANDING):  albuterol/ipratropium for Nebulization 3 milliLiter(s) Nebulizer every 6 hours  ascorbic acid 500 milliGRAM(s) Oral daily  atorvastatin 80 milliGRAM(s) Oral at bedtime  chlorhexidine 0.12% Liquid 15 milliLiter(s) Oral Mucosa two times a day  chlorhexidine 4% Liquid 1 Application(s) Topical <User Schedule>  cyanocobalamin 1000 MICROGram(s) Oral daily  dextrose 50% Injectable 12.5 Gram(s) IV Push once  dextrose 50% Injectable 25 Gram(s) IV Push once  dextrose 50% Injectable 25 Gram(s) IV Push once  enoxaparin Injectable 40 milliGRAM(s) SubCutaneous daily  ergocalciferol Drops 46830 Unit(s) Enteral Tube <User Schedule>  fentaNYL   Patch  25 MICROgram(s)/Hr 1 Patch Transdermal every 72 hours  ferrous    sulfate Liquid 300 milliGRAM(s) Enteral Tube daily  folic acid 1 milliGRAM(s) Oral daily  guaiFENesin   Syrup  (Sugar-Free) 200 milliGRAM(s) Oral every 6 hours  insulin glargine Injectable (LANTUS) 42 Unit(s) SubCutaneous at bedtime  insulin lispro (HumaLOG) corrective regimen sliding scale   SubCutaneous every 6 hours  levothyroxine 100 MICROGram(s) Oral daily  lidocaine   Patch 1 Patch Transdermal daily  melatonin 3 milliGRAM(s) Oral at bedtime  midodrine. 2.5 milliGRAM(s) Oral <User Schedule>  multivitamin 1 Tablet(s) Oral daily  nystatin    Suspension 774751 Unit(s) Oral three times a day  pantoprazole   Suspension 40 milliGRAM(s) Enteral Tube daily  QUEtiapine 25 milliGRAM(s) Oral at bedtime    MEDICATIONS  (PRN):  acetaminophen    Suspension .. 650 milliGRAM(s) Enteral Tube every 6 hours PRN Temp greater or equal to 38C (100.4F), Mild Pain (1 - 3)  dextrose 40% Gel 15 Gram(s) Oral once PRN Blood Glucose LESS THAN 70 milliGRAM(s)/deciliter  glucagon  Injectable 1 milliGRAM(s) IntraMuscular once PRN Glucose LESS THAN 70 milligrams/deciliter      Mode: CPAP with PS  FiO2: 30  PEEP: 5  PS: 12  PIP: 17      Vital Signs Last 24 Hrs  T(C): 36.8 (01 Jul 2020 08:00), Max: 37.1 (30 Jun 2020 20:00)  T(F): 98.2 (01 Jul 2020 08:00), Max: 98.8 (30 Jun 2020 20:00)  HR: 84 (01 Jul 2020 12:21) (74 - 90)  BP: 165/100 (01 Jul 2020 11:00) (121/55 - 165/100)  BP(mean): 82 (01 Jul 2020 08:00) (69 - 85)  RR: 28 (01 Jul 2020 11:00) (25 - 28)  SpO2: 100% (01 Jul 2020 12:21) (97% - 100%) on 30%          06-30 @ 07:01  -  07-01 @ 07:00  --------------------------------------------------------  IN: 1300 mL / OUT: 1275 mL / NET: 25 mL          LABS:                        8.7    11.03 )-----------( 277      ( 01 Jul 2020 07:50 )             28.2     07-01    142  |  105  |  47<H>  ----------------------------<  179<H>  4.1   |  34<H>  |  0.86    Ca    9.3      01 Jul 2020 07:50  Phos  3.9     07-01  Mg     2.0     07-01    TPro  7.2  /  Alb  2.0<L>  /  TBili  0.5  /  DBili  x   /  AST  28  /  ALT  21  /  AlkPhos  114  07-01          CAPILLARY BLOOD GLUCOSE      POCT Blood Glucose.: 160 mg/dL (01 Jul 2020 11:21)                        CULTURES: (if applicable)  Culture Results:   Moderate Pseudomonas aeruginosa (Carbapenem Resistant)  Normal Respiratory Radha absent (06-24 @ 18:37)  Culture Results:   No Growth Final (06-24 @ 14:45)          Physical Examination:  PULM: Clear to auscultation bilaterally, no significant sputum production  CVS: S1, S2 heard    RADIOLOGY REVIEWED  CXR: Slightly increased bilateral patchy opacities DISPLAY PLAN FREE TEXT

## 2022-06-29 NOTE — H&P ADULT - NSHPPHYSICALEXAM_GEN_ALL_CORE
PHYSICAL EXAM:  GENERAL: NAD, well-developed, well-nourished  HEAD:  Atraumatic, Normocephalic  EYES: EOMI, PERRL, conjunctiva and sclera clear  NECK: Supple, No JVD  CHEST/LUNG: Clear to auscultation bilaterally; No wheezes, rales or rhonchi  HEART: Regular rate and rhythm; No murmurs, rubs, or gallops, (+)S1, S2  ABDOMEN: Soft, Nontender, Nondistended; Normal Bowel sounds   EXTREMITIES:  2+ Peripheral Pulses, No clubbing, cyanosis, or edema  PSYCH: normal mood and affect  NEUROLOGY: AAOx3, non-focal- gate no assessed as no room in the hallway of the ED at this time   SKIN: No rashes or lesions

## 2022-06-29 NOTE — H&P ADULT - PROBLEM SELECTOR PLAN 5
Chornic stable  /50  Will restart valsartan-hctz and metoprolol 200mg daily  Hold on amlodipine 10mg daily and assess BP

## 2022-06-29 NOTE — PROGRESS NOTE ADULT - PROBLEM SELECTOR PLAN 1
acute CVA noted on MR Brain  - CTA of Head neck pending  - TTE with bubble study pend  - EP eval for ILR  - Continue ASA, lipitor - would inquire neuro about plavix  - PT/OT eval for safe disposition - but patien tis ambulating without difficulties  - Lovenox SC for VTE ppx  - appreciate neuro consult

## 2022-06-29 NOTE — PHYSICAL THERAPY INITIAL EVALUATION ADULT - PERTINENT HX OF CURRENT PROBLEM, REHAB EVAL
Today he presents as he reports on and off confusion- when asked to describe it he reports it as "out of body". He reports that the confusion has improved and he is now back to his baseline. He also still complains of right knee pain (he injured it last week whilst at work - a door shut on it).

## 2022-06-29 NOTE — PROGRESS NOTE ADULT - PROBLEM SELECTOR PLAN 2
Likely contributed to acute CVA  - diabetic teaching  - will need lifestyle modifications but also due to complication from DM, will also start on metformin   - f/u with PMD as outpatient for continued monitoring and management.

## 2022-06-29 NOTE — H&P ADULT - NSHPREVIEWOFSYSTEMS_GEN_ALL_CORE
REVIEW OF SYSTEMS:    CONSTITUTIONAL: No weakness, fevers or chills  EYES/ENT: No visual changes;  No dysphagia; No sore throat; No rhinorrhea; No sinus pain/pressure  NECK: No pain or stiffness  RESPIRATORY: No cough, wheezing, hemoptysis; No shortness of breath  CARDIOVASCULAR: No chest pain or palpitations; No lower extremity edema  GASTROINTESTINAL: No abdominal or epigastric pain. No nausea, vomiting, or hematemesis; No diarrhea or constipation. No melena or hematochezia.  GENITOURINARY: No dysuria, frequency or hematuria  NEUROLOGICAL: No numbness or weakness + confusion   MSK: ambulates with a cane lately 2/2 right knee pain  SKIN: No itching, burning, rashes, or lesions   All other review of systems is negative unless indicated above.

## 2022-06-29 NOTE — H&P ADULT - HISTORY OF PRESENT ILLNESS
56 yr old male with a pmh of HTN who initially presented on  6/24 for lethargy with  CT head showing mild ventricular and sulcal prominence, bilateral basal ganglia lucencies appear chronic, likely lacunar infarcts, but he left AMA that day. Today he presents as he reports on and off confusion- when asked to describe it he reports it as "out of body". He reports that the confusion has improved and he is now back to his baseline. He also still complains of right knee pain (he injured it last week whilst at work - a door shut on it).   Denies  headache, dizziness, chest pain, palpitations, SOB, abdominal pain, joint pain, diarrhea/constipation, urinary symptoms.   Vitals: T 97.6, HR 81, /50, RR 18 satting 100% RA

## 2022-06-29 NOTE — H&P ADULT - PROBLEM SELECTOR PLAN 4
New  A1c 8.2  LDCS with diabetic diet  Atorvastatin 40mg daily  Hyperglycemia could as well be contributing to waxing/waning confusion

## 2022-06-29 NOTE — PHYSICAL THERAPY INITIAL EVALUATION ADULT - ADDITIONAL COMMENTS
Patient lives with his brother, daughter and father in private home with 5 steps to enter and flight to basement where bedroom/bathroom are located. patient was independent in all ADL prior to admission. Patient was not using assistive device prior to admission.

## 2022-06-29 NOTE — CONSULT NOTE ADULT - ASSESSMENT
56 year old right-handed man with no known past medical history presented initially to [facility] on 6/24, left AMA, and returns requesting an MRI brain. He says his right leg feels weaker since then and he hasn't gone to work since Friday. CT head showed mild ventricular and sulcal prominence, bilateral basal ganglia lucencies appear chronic, likely lacunar infarcts. NIHSS 1. Outside window for tPA. Neurological exam demonstrates right hip flexion and knee extension to be very slightly weak compared to left side with subtle drift of RLE. No sensory loss/extinction. No speech difficulties, headache, vision changes. Labs show hypokalemia and mild hyponatremia. Magnesium normal. ETOH level 29, utox negative. MRI brain done shows DWI restriction in area of left lentiform with ADC correlate, and more faint diffusion restricting lesion on right w/ enhancement on T1 POST, ?subacute lesions may enhance. SWI seq does not show hemorrhage.     Impression: Pure motor syndrome affecting RLE>RUE may be secondary to subacute L MCA territory stroke, mechanism of which is unknown vs. infectious/inflammatory/neoplastic etiology considering atypical appearing lesion    Recommendations:  - Outside window for permissive hypertension given last known well >24 hours, keep SBP <140/90  - Dysphagia screen, if pt fails, order S/S consultation  - Start Aspirin 81mg  - Start Atorvastatin 80mg  - TTE w/ bubble study  - A1C 8.2  - , titrate to LDL <70  - Consider Endocrinology consultation for newly diagnosed diabetes  - Consider EP consult for ILR placement per STROKE-AF trial  - Monitor and replete electrolytes as needed  - Stroke education counseling performed  - PT/OT  - SW/case management    Patient can follow up with Gloria Coelho NP after discharge in 1-2 weeks at:    [address] [facility].   [phone]  [email or web address]    To be discussed w/ neurovascular attending, Dr. Richard Libman. 56 year old right-handed man with no known past medical history presented initially to [facility] on 6/24, left AMA, and returns requesting an MRI brain. He says his right leg feels weaker since then and he hasn't gone to work since Friday. CT head w/o contrast on 6/24 showed mild ventricular and sulcal prominence, bilateral basal ganglia lucencies appear chronic, likely lacunar infarcts. NIHSS 1. Outside window for tPA. Neurological exam demonstrates right hip flexion and knee extension to be very slightly weak compared to left side with subtle drift of RLE. No sensory loss/extinction. No speech difficulties, headache, vision changes. Labs show hypokalemia and mild hyponatremia. Magnesium normal. ETOH level 29, utox negative. MRI brain done shows DWI restriction in area of left lentiform with ADC correlate, and more faint diffusion restricting lesion on right w/ enhancement on T1 POST, ?subacute lesions may enhance. SWI seq does not show hemorrhage. Cerebral atrophy may be out of proportion to age.    Impression: Pure motor syndrome affecting RLE>RUE may be secondary to subacute L MCA territory stroke, mechanism of which is unknown vs. infectious/inflammatory/neoplastic etiology considering atypical appearing lesion    Recommendations:  - Order CT angio head/neck with IV contrast   - Outside window for permissive hypertension given last known well >24 hours, keep SBP <140/90  - Dysphagia screen, if pt fails, order S/S consultation  - Start Aspirin 81mg  - Start Atorvastatin 80mg  - TTE w/ bubble study  - A1C 8.2  - , titrate to LDL <70  - Consider Endocrinology consultation for newly diagnosed diabetes  - Consider EP consult for ILR placement per STROKE-AF trial  - Monitor and replete electrolytes as needed  - Stroke education counseling performed  - PT/OT  - SW/case management    Patient can follow up with Gloria Coelho NP after discharge in 1-2 weeks at:    [address] [facility].   [phone]  [email or web address]    To be discussed w/ neurovascular attending, Dr. Richard Libman.

## 2022-06-29 NOTE — CONSULT NOTE ADULT - TIME BILLING
56-year-old right-handed gentleman first evaluated at [facility] on 6/29/2022 with right leg weakness.  History and exam as above.  ROS otherwise negative.  CT head (6/28/2022) to my eye showed a small infarct in the right anterior limb of internal capsule of indeterminate age, and a slightly larger infarct in the left lentiform nucleus of indeterminate age.  MRI brain (6/29/2022) to my eye showed a probably subacute small infarct in the right anterior limb of internal capsule; a probably acute/subacute infarct in the left lentiform nucleus extending up to the corona radiata, about 3.6 cm in greatest diameter, with very mild hemorrhagic transformation.  CTA neck (6/29/2022) to my eye showed mild calcified plaque at the carotid bifurcations bilaterally without significant stenosis; a tiny calcified plaque in the left subclavian in the region of the vertebral artery origin.  CTA head (6/29/2022) to my eye showed perhaps mild-moderate (officially read as mild) stenosis of the mid-distal left M1; to my eye there was at least mild stenosis of the terminal right ICA but this was not noted on the official report.    Impression.  On 6/24/2022 he developed difficulty with writing and speech, possibly some type of aphasia as well as right leg weakness.  Brain imaging showed recent infarction in both the right anterior limb of internal capsule and left lentiform nucleus extending to the corona radiata.  The left-sided lesion was probably responsible for his deficits and at 3.6 cm in diameter, appears to exceed the conventional limits for small vessel disease.  There is at least mild stenosis of the left M1, suggesting that the mechanism of his larger infarct was branch atheromatous disease, perhaps occluding the origin of more than 1 .  The infarct in the right anterior limb of internal capsule might have been due to small vessel disease.  Suggest.  Elective TTE as inpatient or outpatient; Plavix 300 mg loading dose, then 75 mg daily for 3 weeks; aspirin 81 mg daily; atorvastatin 80 mg daily, target LDL less than 70; he should benefit from aggressive management of all of his vascular risk factors; PT/OT/speech therapy.

## 2022-06-29 NOTE — CONSULT NOTE ADULT - SUBJECTIVE AND OBJECTIVE BOX
Neurology - Consult Note - 06-29-22    Name: DAWSON MTZ; 56y (1965)    Reason for Admission: Altered mental status    HPI:    56 year old right-handed man with no known past medical history presented initially to [facility] on 6/24, left AMA, and returns requesting an MRI brain. He says his right leg feels weaker since then and he hasn't gone to work since Friday. On 6/24, patient presented after co-workers reported that he has been more lethargic today and went to work using a cane, which is not normal for him. Pt denies lethargy and states his nickname is "Sleepy" and he is in his normal state of health. 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 He complained of dull pain at the anterior aspect of his right knee, for which he had been taking NSAIDs for pain control (2 Motrins and 1 Advil). Denies weakness, but was limping. He denied substance use, stating he quit ETOH use on July [birth year], 2021 on his birthday. However, today admits to drinking "around" 2 beers, doesn't remember exactly how many. Pt denies sensory loss, speech changes, headache, nausea, vomiting, cough, chest pain, abdominal pain, fevers, chills, fall, head trauma, loss of consciousness. He has been incontinent of urine per ED team (also occurred during the last visit). When asked more about this, pt says he has the urge to urinate frequently, this has been occurring over the past 1 month. He wet the bed during an ED provider's assessment, which he remembers. No denies seizures. No associated shaking or tongue bite.    At baseline, patient ambulates independently and completes ADLs without assistance. He lives at home with his father, brother, and daughter (age 12). Pt left AMA on 6/24 because he wanted to go home to take care of his daughter.     LKN 6/24 unknown time, preMRS 0, NIHSS 1 (RLE drift).    Review of Systems:  I  CONSTITUTIONAL: No fevers or chills  EYES/ENT: No visual changes or no throat pain   NECK: No pain or stiffness  RESPIRATORY: No hemoptysis or shortness of breath  CARDIOVASCULAR: No chest pain or palpitations  GASTROINTESTINAL: No melena or hematochezia  GENITOURINARY: No dysuria or hematuria  NEUROLOGICAL: +As stated in HPI above  SKIN: No itching, burning, rashes, or lesions   All other review of systems is negative unless indicated above.    Allergies:  NKDA    PMHx:   Benign essential HTN    PFHx:   No pertinent family history in first degree relatives    PSuHx:   No significant past surgical history      Medications:  MEDICATIONS  (STANDING):  aspirin  chewable 81 milliGRAM(s) Oral daily  atorvastatin 40 milliGRAM(s) Oral at bedtime  dextrose 5%. 1000 milliLiter(s) (50 mL/Hr) IV Continuous <Continuous>  dextrose 5%. 1000 milliLiter(s) (100 mL/Hr) IV Continuous <Continuous>  dextrose 50% Injectable 25 Gram(s) IV Push once  dextrose 50% Injectable 12.5 Gram(s) IV Push once  dextrose 50% Injectable 25 Gram(s) IV Push once  enoxaparin Injectable 40 milliGRAM(s) SubCutaneous every 24 hours  glucagon  Injectable 1 milliGRAM(s) IntraMuscular once  hydrochlorothiazide 25 milliGRAM(s) Oral daily  insulin lispro (ADMELOG) corrective regimen sliding scale   SubCutaneous three times a day before meals  insulin lispro (ADMELOG) corrective regimen sliding scale   SubCutaneous at bedtime  metoprolol succinate  milliGRAM(s) Oral daily  valsartan 160 milliGRAM(s) Oral daily    MEDICATIONS  (PRN):  acetaminophen     Tablet .. 650 milliGRAM(s) Oral every 6 hours PRN Temp greater or equal to 38C (100.4F), Mild Pain (1 - 3)  aluminum hydroxide/magnesium hydroxide/simethicone Suspension 30 milliLiter(s) Oral every 4 hours PRN Dyspepsia  dextrose Oral Gel 15 Gram(s) Oral once PRN Blood Glucose LESS THAN 70 milliGRAM(s)/deciliter  melatonin 3 milliGRAM(s) Oral at bedtime PRN Insomnia  ondansetron Injectable 4 milliGRAM(s) IV Push every 8 hours PRN Nausea and/or Vomiting      Vitals:  T(C): 37.3 (06-29-22 @ 02:59), Max: 37.3 (06-29-22 @ 02:59)  HR: 90 (06-29-22 @ 02:59) (81 - 105)  BP: 153/71 (06-29-22 @ 02:59) (105/50 - 153/71)  RR: 17 (06-29-22 @ 02:59) (16 - 18)  SpO2: 99% (06-29-22 @ 02:59) (99% - 100%)    PHYSICAL EXAM:  GENERAL: Obese man in NAD, appears drowsy  HEENT: Normocephalic; conjunctivae and slight scleral icterus may be physiologic; proptosis bilaterally, dry mucous membranes  NECK: Supple  EXTREMITIES: No cyanosis; no edema; no calf tenderness  SKIN: Warm and dry; no rash             Neurological Exam:  - Mental Status: Alert, oriented to self, place (knows name of hospital), month, year, president, has insight into hospital visit, speech is fluent with intact naming, repetition, and comprehension; follows crossed commands; spelled WORLD backwards; can say how many quarters in $1.75  - Cranial Nerves II-XII:    II:  PERRL 3mm b/l; visual fields are full to confrontation  III, IV, VI: ?appearance of bilateral ptosis may be due to proptosis, EOMI, no nystagmus  V:  facial sensation is intact in the V1-V3 distribution bilaterally.  VII:  face is symmetric with normal eye closure and smile  VIII:  hearing is intact to finger rub  IX, X: no dysarthria, uvula is midline and soft palate rises symmetrically  XI:  head turning and shoulder shrug are intact bilaterally  XII:  tongue protrudes in the midline  - Motor: subtle drift of RLE, no pronator drift, right hip flexion 4+/5, right knee extension 4+/5, otherwise 5/5 throughout; normal muscle bulk and tone throughout; no pronator drift; no involuntary movements or seizure-like activity; postural tremor, left worse than right  - Reflexes:  2+ and symmetric at the biceps, triceps, brachioradialis, could not elicit patellar reflexes or ankle jerks, plantar response: toes downgoing bilaterally  - Sensory:  intact to light touch, pin prick, and joint-position sense throughout  - Coordination:  finger-nose-finger and heel-knee-shin intact without dysmetria   - Gait: bears weight on his left leg, appears like an antalgic gait, walks with everted feet; Romberg testing is negative    Labs:                        16.3   6.72  )-----------( 254      ( 28 Jun 2022 20:58 )             47.9     06-28    134<L>  |  94<L>  |  8   ----------------------------<  162<H>  3.4<L>   |  21<L>  |  0.60    Ca    9.4      28 Jun 2022 20:58  Mg     2.20     06-28    TPro  7.6  /  Alb  4.0  /  TBili  0.8  /  DBili  x   /  AST  36  /  ALT  34  /  AlkPhos  99  06-28    CAPILLARY BLOOD GLUCOSE      POCT Blood Glucose.: 169 mg/dL (28 Jun 2022 19:52)    LIVER FUNCTIONS - ( 28 Jun 2022 20:58 )  Alb: 4.0 g/dL / Pro: 7.6 g/dL / ALK PHOS: 99 U/L / ALT: 34 U/L / AST: 36 U/L / GGT: x             PT/INR - ( 28 Jun 2022 21:20 )   PT: 12.1 sec;   INR: 1.04 ratio         PTT - ( 28 Jun 2022 21:20 )  PTT:30.0 sec  CSF:                  Radiology:  CT Head No Cont:  (24 Jun 2022 17:20)     Neurology - Consult Note - 06-29-22    Name: DAWSON MTZ; 56y (1965)    Reason for Admission: Altered mental status    HPI:    56 year old right-handed man with no known past medical history presented initially to [facility] on 6/24, left AMA, and returns requesting an MRI brain. He says his right leg feels weaker since then and he hasn't gone to work since Friday. On 6/24, patient presented after co-workers reported that he has been more lethargic today and went to work using a cane, which is not normal for him. Pt denies lethargy and states his nickname is "Sleepy" and he is in his normal state of health. 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 He complained of dull pain at the anterior aspect of his right knee, for which he had been taking NSAIDs for pain control (2 Motrins and 1 Advil). Denies weakness, but was limping. He denied substance use, stating he quit ETOH use on July [birth year] 2021 on his birthday. However, today admits to drinking "around" 2 beers, doesn't remember exactly how many. Pt denies sensory loss, speech changes, headache, nausea, vomiting, cough, chest pain, abdominal pain, fevers, chills, fall, head trauma, loss of consciousness. He has been incontinent of urine per ED team (also occurred during the last visit). When asked more about this, pt says he has the urge to urinate frequently, this has been occurring over the past 1 month. He wet the bed during an ED provider's assessment, which he remembers. No denies seizures. No associated shaking or tongue bite.    At baseline, patient ambulates independently and completes ADLs without assistance. He lives at home with his father, brother, and daughter (age 12). Pt left AMA on 6/24 because he wanted to go home to take care of his daughter.     LKN 6/24 unknown time, preMRS 0, NIHSS 1 (RLE drift).    Review of Systems:    CONSTITUTIONAL: No fevers or chills  EYES/ENT: No visual changes or no throat pain   NECK: No pain or stiffness  RESPIRATORY: No hemoptysis or shortness of breath  CARDIOVASCULAR: No chest pain or palpitations  GASTROINTESTINAL: No melena or hematochezia  GENITOURINARY: No dysuria or hematuria  NEUROLOGICAL: +As stated in HPI above  SKIN: No itching, burning, rashes, or lesions   All other review of systems is negative unless indicated above.    Allergies:  NKDA    PMHx:   Benign essential HTN    PFHx:   No pertinent family history in first degree relatives    PSuHx:   No significant past surgical history      Medications:  MEDICATIONS  (STANDING):  aspirin  chewable 81 milliGRAM(s) Oral daily  atorvastatin 40 milliGRAM(s) Oral at bedtime  dextrose 5%. 1000 milliLiter(s) (50 mL/Hr) IV Continuous <Continuous>  dextrose 5%. 1000 milliLiter(s) (100 mL/Hr) IV Continuous <Continuous>  dextrose 50% Injectable 25 Gram(s) IV Push once  dextrose 50% Injectable 12.5 Gram(s) IV Push once  dextrose 50% Injectable 25 Gram(s) IV Push once  enoxaparin Injectable 40 milliGRAM(s) SubCutaneous every 24 hours  glucagon  Injectable 1 milliGRAM(s) IntraMuscular once  hydrochlorothiazide 25 milliGRAM(s) Oral daily  insulin lispro (ADMELOG) corrective regimen sliding scale   SubCutaneous three times a day before meals  insulin lispro (ADMELOG) corrective regimen sliding scale   SubCutaneous at bedtime  metoprolol succinate  milliGRAM(s) Oral daily  valsartan 160 milliGRAM(s) Oral daily    MEDICATIONS  (PRN):  acetaminophen     Tablet .. 650 milliGRAM(s) Oral every 6 hours PRN Temp greater or equal to 38C (100.4F), Mild Pain (1 - 3)  aluminum hydroxide/magnesium hydroxide/simethicone Suspension 30 milliLiter(s) Oral every 4 hours PRN Dyspepsia  dextrose Oral Gel 15 Gram(s) Oral once PRN Blood Glucose LESS THAN 70 milliGRAM(s)/deciliter  melatonin 3 milliGRAM(s) Oral at bedtime PRN Insomnia  ondansetron Injectable 4 milliGRAM(s) IV Push every 8 hours PRN Nausea and/or Vomiting      Vitals:  T(C): 37.3 (06-29-22 @ 02:59), Max: 37.3 (06-29-22 @ 02:59)  HR: 90 (06-29-22 @ 02:59) (81 - 105)  BP: 153/71 (06-29-22 @ 02:59) (105/50 - 153/71)  RR: 17 (06-29-22 @ 02:59) (16 - 18)  SpO2: 99% (06-29-22 @ 02:59) (99% - 100%)    PHYSICAL EXAM:  GENERAL: Obese man in NAD, appears drowsy  HEENT: Normocephalic; conjunctivae and slight scleral icterus may be physiologic; proptosis bilaterally, dry mucous membranes  NECK: Supple  EXTREMITIES: No cyanosis; no edema; no calf tenderness  SKIN: Warm and dry; no rash             Neurological Exam:  - Mental Status: Alert, oriented to self, place (knows name of hospital), month, year, president, has insight into hospital visit, speech is fluent with intact naming, repetition, and comprehension; follows crossed commands; spelled WORLD backwards; can say how many quarters in $1.75  - Cranial Nerves II-XII:    II:  PERRL 3mm b/l; visual fields are full to confrontation  III, IV, VI: ?appearance of bilateral ptosis may be due to proptosis, EOMI, no nystagmus  V:  facial sensation is intact in the V1-V3 distribution bilaterally.  VII:  face is symmetric with normal eye closure and smile  VIII:  hearing is intact to finger rub  IX, X: no dysarthria, uvula is midline and soft palate rises symmetrically  XI:  head turning and shoulder shrug are intact bilaterally  XII:  tongue protrudes in the midline  - Motor: subtle drift of RLE, no pronator drift, right hip flexion 4+/5, right knee extension 4+/5, otherwise 5/5 throughout; normal muscle bulk and tone throughout; no pronator drift; no involuntary movements or seizure-like activity; postural tremor, left worse than right  - Reflexes:  2+ and symmetric at the biceps, triceps, brachioradialis, could not elicit patellar reflexes or ankle jerks, plantar response: toes downgoing bilaterally  - Sensory:  intact to light touch, pin prick, and joint-position sense throughout  - Coordination:  finger-nose-finger and heel-knee-shin intact without dysmetria   - Gait: bears weight on his left leg, appears like an antalgic gait, walks with everted feet; Romberg testing is negative    Labs:                        16.3   6.72  )-----------( 254      ( 28 Jun 2022 20:58 )             47.9     06-28    134<L>  |  94<L>  |  8   ----------------------------<  162<H>  3.4<L>   |  21<L>  |  0.60    Ca    9.4      28 Jun 2022 20:58  Mg     2.20     06-28    TPro  7.6  /  Alb  4.0  /  TBili  0.8  /  DBili  x   /  AST  36  /  ALT  34  /  AlkPhos  99  06-28    CAPILLARY BLOOD GLUCOSE      POCT Blood Glucose.: 169 mg/dL (28 Jun 2022 19:52)    LIVER FUNCTIONS - ( 28 Jun 2022 20:58 )  Alb: 4.0 g/dL / Pro: 7.6 g/dL / ALK PHOS: 99 U/L / ALT: 34 U/L / AST: 36 U/L / GGT: x             PT/INR - ( 28 Jun 2022 21:20 )   PT: 12.1 sec;   INR: 1.04 ratio         PTT - ( 28 Jun 2022 21:20 )  PTT:30.0 sec        Radiology:  CT Head No Cont:  (24 Jun 2022 17:20)  IMPRESSION:  Mild ventricular and sulcal prominence raising the possibility of chronic   illness or certain medications. Bilateral basal ganglia lucencies   suspicious for infarcts of undetermined age. No hemorrhage.

## 2022-06-29 NOTE — H&P ADULT - NSHPSOCIALHISTORY_GEN_ALL_CORE
Does not use tobacco products or partake in illicit drug use   Initially when asked if he drinks he told me no. Then when I asked again he said sometimes- a couple times a week 1-2 beers

## 2022-06-29 NOTE — H&P ADULT - PROBLEM SELECTOR PLAN 1
New  waxing and waning confusion that is now improved.   AAO x 4   CT head: mild ventricular and sulcal prominence, bilateral basal ganglia lucencies appear chronic, likely lacunar infarcts.  Neurology consulted with recommendations ordered: Routine EEG, MRI brain w/wo contrast, Utox neg, TSH 0.94, Order B1, B6, B12, vitamin D, lipid profile, Start ASA 81mg daily New  waxing and waning confusion that is now improved.   AAO x 4   CT head: mild ventricular and sulcal prominence, bilateral basal ganglia lucencies appear chronic, likely lacunar infarcts.  UA ordered to r/o infectious cause  Neurology consulted with recommendations ordered: Routine EEG, MRI brain w/wo contrast, Utox neg, TSH 0.94, Order B1, B6, B12, vitamin D, lipid profile, Start ASA 81mg daily

## 2022-06-29 NOTE — H&P ADULT - NSHPLABSRESULTS_GEN_ALL_CORE
labs reviewed                        16.3   6.72  )-----------( 254      ( 28 Jun 2022 20:58 )             47.9       06-28    134<L>  |  94<L>  |  8   ----------------------------<  162<H>  3.4<L>   |  21<L>  |  0.60    Ca    9.4      28 Jun 2022 20:58    TPro  7.6  /  Alb  4.0  /  TBili  0.8  /  DBili  x   /  AST  36  /  ALT  34  /  AlkPhos  99  06-28      PT/INR - ( 28 Jun 2022 21:20 )   PT: 12.1 sec;   INR: 1.04 ratio    PTT - ( 28 Jun 2022 21:20 )  PTT:30.0 sec   CT head interpreted by radiology: 6/24/22: mild ventricular and sulcal prominence, bilateral basal ganglia lucencies appear chronic, likely lacunar infarcts.

## 2022-06-29 NOTE — H&P ADULT - PROBLEM SELECTOR PLAN 3
New  injury last week to the knee  Images 6/24: no acute findings  symptomatic treatment with tylenol PRN  PT consult

## 2022-06-30 ENCOUNTER — TRANSCRIPTION ENCOUNTER (OUTPATIENT)
Age: 57
End: 2022-06-30

## 2022-06-30 VITALS
DIASTOLIC BLOOD PRESSURE: 77 MMHG | RESPIRATION RATE: 18 BRPM | SYSTOLIC BLOOD PRESSURE: 136 MMHG | TEMPERATURE: 99 F | OXYGEN SATURATION: 98 % | HEART RATE: 94 BPM

## 2022-06-30 LAB
ANION GAP SERPL CALC-SCNC: 11 MMOL/L — SIGNIFICANT CHANGE UP (ref 7–14)
BUN SERPL-MCNC: 28 MG/DL — HIGH (ref 7–23)
CALCIUM SERPL-MCNC: 9 MG/DL — SIGNIFICANT CHANGE UP (ref 8.4–10.5)
CHLORIDE SERPL-SCNC: 101 MMOL/L — SIGNIFICANT CHANGE UP (ref 98–107)
CO2 SERPL-SCNC: 25 MMOL/L — SIGNIFICANT CHANGE UP (ref 22–31)
CREAT SERPL-MCNC: 0.57 MG/DL — SIGNIFICANT CHANGE UP (ref 0.5–1.3)
EGFR: 116 ML/MIN/1.73M2 — SIGNIFICANT CHANGE UP
GLUCOSE SERPL-MCNC: 162 MG/DL — HIGH (ref 70–99)
HCT VFR BLD CALC: 44.1 % — SIGNIFICANT CHANGE UP (ref 39–50)
HGB BLD-MCNC: 14.4 G/DL — SIGNIFICANT CHANGE UP (ref 13–17)
MAGNESIUM SERPL-MCNC: 1.9 MG/DL — SIGNIFICANT CHANGE UP (ref 1.6–2.6)
MCHC RBC-ENTMCNC: 27.7 PG — SIGNIFICANT CHANGE UP (ref 27–34)
MCHC RBC-ENTMCNC: 32.7 GM/DL — SIGNIFICANT CHANGE UP (ref 32–36)
MCV RBC AUTO: 85 FL — SIGNIFICANT CHANGE UP (ref 80–100)
NRBC # BLD: 0 /100 WBCS — SIGNIFICANT CHANGE UP
NRBC # FLD: 0 K/UL — SIGNIFICANT CHANGE UP
PHOSPHATE SERPL-MCNC: 2.5 MG/DL — SIGNIFICANT CHANGE UP (ref 2.5–4.5)
PLATELET # BLD AUTO: 266 K/UL — SIGNIFICANT CHANGE UP (ref 150–400)
POTASSIUM SERPL-MCNC: 3.8 MMOL/L — SIGNIFICANT CHANGE UP (ref 3.5–5.3)
POTASSIUM SERPL-SCNC: 3.8 MMOL/L — SIGNIFICANT CHANGE UP (ref 3.5–5.3)
RBC # BLD: 5.19 M/UL — SIGNIFICANT CHANGE UP (ref 4.2–5.8)
RBC # FLD: 13.7 % — SIGNIFICANT CHANGE UP (ref 10.3–14.5)
SODIUM SERPL-SCNC: 137 MMOL/L — SIGNIFICANT CHANGE UP (ref 135–145)
WBC # BLD: 9.26 K/UL — SIGNIFICANT CHANGE UP (ref 3.8–10.5)
WBC # FLD AUTO: 9.26 K/UL — SIGNIFICANT CHANGE UP (ref 3.8–10.5)

## 2022-06-30 PROCEDURE — 99239 HOSP IP/OBS DSCHRG MGMT >30: CPT

## 2022-06-30 PROCEDURE — 93306 TTE W/DOPPLER COMPLETE: CPT | Mod: 26

## 2022-06-30 RX ORDER — METOPROLOL TARTRATE 50 MG
1 TABLET ORAL
Qty: 30 | Refills: 0
Start: 2022-06-30 | End: 2022-07-29

## 2022-06-30 RX ORDER — AMLODIPINE BESYLATE 2.5 MG/1
0 TABLET ORAL
Qty: 0 | Refills: 0 | DISCHARGE

## 2022-06-30 RX ORDER — METFORMIN HYDROCHLORIDE 850 MG/1
1 TABLET ORAL
Qty: 60 | Refills: 0
Start: 2022-06-30 | End: 2022-07-29

## 2022-06-30 RX ORDER — METOPROLOL TARTRATE 50 MG
0 TABLET ORAL
Qty: 0 | Refills: 0 | DISCHARGE

## 2022-06-30 RX ORDER — LANOLIN ALCOHOL/MO/W.PET/CERES
1 CREAM (GRAM) TOPICAL
Qty: 30 | Refills: 0
Start: 2022-06-30 | End: 2022-07-29

## 2022-06-30 RX ORDER — ISOPROPYL ALCOHOL, BENZOCAINE .7; .06 ML/ML; ML/ML
1 SWAB TOPICAL
Qty: 100 | Refills: 1
Start: 2022-06-30 | End: 2022-08-18

## 2022-06-30 RX ORDER — ACETAMINOPHEN 500 MG
2 TABLET ORAL
Qty: 0 | Refills: 0 | DISCHARGE
Start: 2022-06-30

## 2022-06-30 RX ORDER — ATORVASTATIN CALCIUM 80 MG/1
1 TABLET, FILM COATED ORAL
Qty: 30 | Refills: 0
Start: 2022-06-30 | End: 2022-07-29

## 2022-06-30 RX ORDER — ASPIRIN/CALCIUM CARB/MAGNESIUM 324 MG
1 TABLET ORAL
Qty: 30 | Refills: 0
Start: 2022-06-30 | End: 2022-07-29

## 2022-06-30 RX ADMIN — Medication 200 MILLIGRAM(S): at 05:48

## 2022-06-30 RX ADMIN — Medication 1: at 13:02

## 2022-06-30 RX ADMIN — VALSARTAN 160 MILLIGRAM(S): 80 TABLET ORAL at 05:48

## 2022-06-30 RX ADMIN — ENOXAPARIN SODIUM 40 MILLIGRAM(S): 100 INJECTION SUBCUTANEOUS at 12:53

## 2022-06-30 RX ADMIN — CLOPIDOGREL BISULFATE 75 MILLIGRAM(S): 75 TABLET, FILM COATED ORAL at 12:53

## 2022-06-30 RX ADMIN — Medication 81 MILLIGRAM(S): at 12:53

## 2022-06-30 RX ADMIN — Medication 1: at 09:08

## 2022-06-30 RX ADMIN — Medication 25 MILLIGRAM(S): at 05:48

## 2022-06-30 NOTE — PROGRESS NOTE ADULT - ASSESSMENT
56M HTN, p/w acute CVA with encephalpathy and  RLE weakness - improved c/b new diagnosis of DM type 2 - A1c 8.1.
56M HTN, p/w acute CVA with encephalpathy and  RLE weakness - improved c/b new diagnosis of DM type 2 - A1c 8.1.

## 2022-06-30 NOTE — DISCHARGE NOTE PROVIDER - CARE PROVIDERS DIRECT ADDRESSES
[email or web address]

## 2022-06-30 NOTE — DISCHARGE NOTE NURSING/CASE MANAGEMENT/SOCIAL WORK - PATIENT PORTAL LINK FT
You can access the FollowMyHealth Patient Portal offered by [facility] by registering at the following website: [email or web address]. By joining [employer]’s FollowMyHealth portal, you will also be able to view your health information using other applications (apps) compatible with our system.

## 2022-06-30 NOTE — CHART NOTE - NSCHARTNOTEFT_GEN_A_CORE
ACP NIGHT MEDICINE COVERAGE    Notified by RN that pt is tachy to 130 on tele, sinus tach.  Pt noted to be ambulating at that time to restroom at that time.  Pt's HR noted to return to low 90s once laying back in his hospital bed - sinus rhythm on tele, VSS on assessment by RN.  Spoke to pt at bedside, he denies any chest pain, difficulty breathing, dizziness, palpitations, or weakness while walking.  Presently, he says he feels "good".  Pt in no signs of distress.  Will continue to monitor pt on tele at this time, f/u AM labs.  Will d/w day provider.  Pt stable at this time, will continue to monitor.    Vital Signs Last 24 Hrs  T(C): 36.9 (30 Jun 2022 05:30), Max: 36.9 (29 Jun 2022 10:41)  T(F): 98.4 (30 Jun 2022 05:30), Max: 98.5 (29 Jun 2022 10:41)  HR: 100 (30 Jun 2022 05:30) (70 - 100)  BP: 148/82 (30 Jun 2022 05:30) (125/60 - 154/96)  RR: 18 (30 Jun 2022 05:30) (17 - 18)  SpO2: 100% (30 Jun 2022 05:30) (97% - 100%)    Zachery Gallagher PA-C  Department of Medicine - [facility]  In-House Pager: #[phone]
Discussed w/ Neurology, recommendations as follows:  - Plavix load 300 mg today followed by Plavix 75 mg QD x 3 weeks  - c/w ASA, increase Atorvastatin 80 mg daily  - TTE w/ bubble study  - No need for EP ILR at this time  - No need for EEG at this time      Latonia Austin NP-BC  Department of Medicine  In House Pager #[phone]
Neurology evaluated this patient on 6/24/22. He left AMA and returns requesting MRI brain. He is confused about last week's events. He admits to drinking 2 beers today. Smokes daily.    Impression: Gait imbalance may be due to cerebellar ataxia of unknown etiology (consider stroke vs. ETOH use vs. degenerative) in the setting of bilateral intention tremor (left worse than right). For lethargy, patient should undergo screening for CESAR and hypothyroidism if toxic/metabolic workup is negative.    Recommendations:  - Routine EEG - will follow up results  - MRI brain with and without contrast - will follow up results  - Start Aspirin 81mg if patient is not already taking it at home  - Urine tox screen neg  - A1C 8.2, TSH 0.94  - Order B1, B6, B12, vitamin D, lipid profile    Thank you.    Enedelia Parker,   PGY-3  Neurology Resident

## 2022-06-30 NOTE — DISCHARGE NOTE PROVIDER - NSDCCPCAREPLAN_GEN_ALL_CORE_FT
PRINCIPAL DISCHARGE DIAGNOSIS  Diagnosis: Altered mental status  Assessment and Plan of Treatment: You came into the hospital because you had confusion. You had an MRI of your brain which showed an acute Stroke. You were started on blood thinners Please continue taking Plavix 75mg Daily through 7/19/2021. You will need continue to follow up with Neurology as an outpatient.      SECONDARY DISCHARGE DIAGNOSES  Diagnosis: New onset type 2 diabetes mellitus  Assessment and Plan of Treatment: You were diagnosed with new onset Diabetes. You were started on Metformin. Continue a consistent carbohydrate diet (Meaning eating the same amount of carbohydrates at the same time each day). Monitor blood glucose levels throughout the day before meals and at bedtime. Record blood sugars and bring to outpatient providers appointment in order to be reviewed by your doctor for management modifications. If your sugars are more than 400 or less than 70 you should contact your PCP immediately. Monitor for signs/symptoms of low blood glucose, such as, dizziness, altered mental status, or cool/clammy skin. In addition, monitor for signs/symptoms of high blood glucose, such as, feeling hot, dry, fatigued, or with increased thirst/urination. Make regular podiatry appointments in order to have feet checked for wounds and uncontrolled toe nail growth to prevent infections, as well as, appointments with an ophthalmologist to monitor your vision.    Diagnosis: Benign essential HTN  Assessment and Plan of Treatment: Continue blood pressure medication regimen as directed. Monitor for any visual changes, headaches or dizziness.  Monitor blood pressure regularly.  Follow up with your primary care provider for further management for high blood pressure.

## 2022-06-30 NOTE — DISCHARGE NOTE PROVIDER - HOSPITAL COURSE
56M HTN, p/w acute CVA with encephalpathy and  RLE weakness - improved c/b new diagnosis of DM type 2 - A1c 8.1.    #CVA (cerebrovascular accident).   -Acute CVA noted on MR Brain  - CTA of Head neck reviewed  - TTE with bubble study reviewed  - As per Neuro - no ILR  - Continue ASA, lipitor - would inquire neuro about plavix  - PT/OT eval for safe disposition - but patien tis ambulating without difficulties  - Lovenox SC for VTE ppx  - appreciate neuro consult.      #New onset type 2 diabetes mellitus.   - Likely contributed to acute CVA  - diabetic teaching  - will need lifestyle modifications but also due to complication from DM, will also start on metformin       #Benign essential HTN.   -Continued on HCTZ, Toprol XL, Valsartan         Patient seen and evaluated. Reviewed discharge medications with patient and attending. All new medications requiring new prescriptions were sent to the pharmacy of patient's choice. Reviewed need for prescription for previous home medications and new prescriptions sent if requested. Medically cleared/stable for discharge as per Dr. Mccormick with appropriate follow up. Patient understands and agrees with plan of care.

## 2022-06-30 NOTE — PROVIDER CONTACT NOTE (OTHER) - ASSESSMENT
Patient HR went upto 130's while ambulating. Not in any acute distress. Vital stable. HR down to 100's while resting.
patient AOx4, in no acute distress.

## 2022-06-30 NOTE — DISCHARGE NOTE PROVIDER - NSDCMRMEDTOKEN_GEN_ALL_CORE_FT
acetaminophen 325 mg oral tablet: 2 tab(s) orally every 6 hours, As needed, Temp greater or equal to 38C (100.4F), Mild Pain (1 - 3)  alcohol swabs : Apply topically to affected area 4 times a day   aluminum hydroxide-magnesium hydroxide 200 mg-200 mg/5 mL oral suspension: 30 milliliter(s) orally every 4 hours, As needed, Dyspepsia  aspirin 81 mg oral tablet, chewable: 1 tab(s) orally once a day  atorvastatin 80 mg oral tablet: 1 tab(s) orally once a day (at bedtime)  clopidogrel 75 mg oral tablet: 1 tab(s) orally once a day continue through 7/19/2022    price check; [id number]  glucometer (per patient&#x27;s insurance): Test blood sugars four times a day. Dispense #1 glucometer.  lancets: 1 application subcutaneously 4 times a day   melatonin 3 mg oral tablet: 1 tab(s) orally once a day (at bedtime), As needed, Insomnia  metFORMIN 1000 mg oral tablet: 1 tab(s) orally every 12 hours   metoprolol succinate 200 mg oral tablet, extended release: 1 tab(s) orally once a day  test strips (per patient&#x27;s insurance): 1 application subcutaneously 4 times a day. ** Compatible with patient&#x27;s glucometer **  valsartan-hydrochlorothiazide 160 mg-25 mg oral tablet: 1 tab(s) orally once a day    acetaminophen 325 mg oral tablet: 2 tab(s) orally every 6 hours, As needed, Temp greater or equal to 38C (100.4F), Mild Pain (1 - 3)  alcohol swabs : Apply topically to affected area 4 times a day   aluminum hydroxide-magnesium hydroxide 200 mg-200 mg/5 mL oral suspension: 30 milliliter(s) orally every 4 hours, As needed, Dyspepsia  aspirin 81 mg oral tablet, chewable: 1 tab(s) orally once a day  atorvastatin 80 mg oral tablet: 1 tab(s) orally once a day (at bedtime)  clopidogrel 75 mg oral tablet: 1 tab(s) orally once a day continue through 7/19/2022      glucometer (per patient&#x27;s insurance): Test blood sugars four times a day. Dispense #1 glucometer.  lancets: 1 application subcutaneously 4 times a day   melatonin 3 mg oral tablet: 1 tab(s) orally once a day (at bedtime), As needed, Insomnia  metFORMIN 1000 mg oral tablet: 1 tab(s) orally every 12 hours   metoprolol succinate 200 mg oral tablet, extended release: 1 tab(s) orally once a day  test strips (per patient&#x27;s insurance): 1 application subcutaneously 4 times a day. ** Compatible with patient&#x27;s glucometer **  valsartan-hydrochlorothiazide 160 mg-25 mg oral tablet: 1 tab(s) orally once a day

## 2022-06-30 NOTE — DISCHARGE NOTE PROVIDER - NSDCQMAMI_CARD_ALL_CORE
[facility]  [facility]. [place]  [place] [address]  Dept: [phone]  Dept Fax: [phone]  Loc: [facility]       Chief Complaint   Patient presents with    Congestion     sore throat,ear pain mostly left ear,cough with clear mucus,headache,sinus pressure,started monday       Nurses Notes reviewed and I agree except as noted in the HPI. HISTORY OF PRESENT ILLNESS   Eben Saba is a 72 y.o. female who presents to [facility] today (2/19/2021) for evaluation of:   Sinusitis  This is a new problem. The current episode started in the past 7 days (2/15/21). The problem is unchanged. There has been no fever. Associated symptoms include congestion, coughing, ear pain (left), a hoarse voice, sinus pressure and a sore throat. Pertinent negatives include no shortness of breath. Past treatments include acetaminophen and oral decongestants. The treatment provided no relief. REVIEW OF SYSTEMS     Review of Systems   Constitutional: Negative for fever. HENT: Positive for congestion, ear pain (left), hoarse voice, postnasal drip, sinus pressure, sore throat and voice change. Respiratory: Positive for cough. Negative for shortness of breath and wheezing. Cardiovascular: Negative for chest pain. Gastrointestinal: Negative for diarrhea, nausea and vomiting. Musculoskeletal: Negative for myalgias.        PAST MEDICAL HISTORY         Diagnosis Date    Allergic rhinitis     Anxiety     Asthma     Bronchitis     Chest pain     Chronic back pain     Depression     Fibromyalgia     GERD (gastroesophageal reflux disease)     Headache(784.0)     Heart palpitations     Irregular bowel habits     Macular degeneration     beginning    Measles     Osteoarthritis     Restless legs syndrome     Tachycardia     Unspecified sleep apnea     Wet senile macular degeneration (HCC) bilateral- Sees a retinal Dr Romayne Innocent     Patient  has a past surgical history that includes knee surgery; Knee arthroscopy; Tubal ligation; Tonsillectomy; sigmoidoscopy; eye surgery; sinus surgery; Colonoscopy; knee surgery (Left, 11/2014); Dilation and curettage of uterus (N/A, 03/12/2019); and Dilation and curettage of uterus (N/A, 3/12/2019).     CURRENT MEDICATIONS       Outpatient Medications Prior to Visit   Medication Sig Dispense Refill    hydrOXYzine (ATARAX) 25 MG tablet Take 1 tablet by mouth nightly as needed (intersitial cystitis) 90 tablet 1    montelukast (SINGULAIR) 10 MG tablet take 1 tablet by mouth every evening 90 tablet 1    cholestyramine (QUESTRAN) 4 g packet Take 1 packet by mouth daily 30 packet 3    propranolol (INDERAL LA) 60 MG extended release capsule take 1 capsule by mouth once daily 30 capsule 3    buPROPion (WELLBUTRIN XL) 150 MG extended release tablet Take 1 tablet by mouth every morning 90 tablet 3    triamcinolone (KENALOG) 0.025 % cream Apply Topically daily as needed for itching 80 g 1    pantoprazole (PROTONIX) 40 MG tablet take 1 tablet by mouth once daily 30 tablet 5    NONFORMULARY Take 1 capsule by mouth daily Cholesterol 360      Multiple Vitamins-Minerals (PRESERVISION AREDS 2) CHEW Take 1 each by mouth 2 times daily      VITAMIN E PO Take 1 capsule by mouth daily      Ascorbic Acid (VITAMIN C PO) Take 1 tablet by mouth daily      Cholecalciferol (VITAMIN D3 PO) Take 1 tablet by mouth daily      BIOTIN PO Take 1 tablet by mouth daily      FLUoxetine (PROZAC) 40 MG capsule take 1 capsule by mouth once daily 90 capsule 3    albuterol (PROVENTIL) (2.5 MG/3ML) 0.083% nebulizer solution Take 3 mLs by nebulization every 4 hours as needed for Wheezing or Shortness of Breath 25 each 3    Nebulizers (COMPRESSOR/NEBULIZER) MISC Use as directed 1 each 3    CALCIUM-MAGNESIUM-VITAMIN D PO Take by mouth Pupils: Pupils are equal, round, and reactive to light. Neck:      Musculoskeletal: Normal range of motion and neck supple. No neck rigidity. Cardiovascular:      Rate and Rhythm: Normal rate and regular rhythm. Heart sounds: Normal heart sounds, S1 normal and S2 normal. No murmur. Pulmonary:      Effort: Pulmonary effort is normal. No accessory muscle usage or respiratory distress. Breath sounds: Normal breath sounds. No wheezing or rhonchi. Musculoskeletal: Normal range of motion. Lymphadenopathy:      Cervical: Cervical adenopathy (left palplable node, tender) present. Skin:     General: Skin is warm and dry. Capillary Refill: Capillary refill takes less than 2 seconds. Neurological:      General: No focal deficit present. Mental Status: She is alert. DIAGNOSTIC RESULTS   Labs:No results found for this visit on 02/19/21. IMAGING:        CLINICAL COURSE:     Vitals:    02/19/21 1042   BP: 132/80   Pulse: 71   Resp: 18   Temp: 97.1 °F (36.2 °C)   TempSrc: Temporal   SpO2: 95%   Weight: 222 lb 9.6 oz (101 kg)   Height: 5' 8\" (1.727 m)           PROCEDURES:  None  FINAL IMPRESSION      1. Non-recurrent acute suppurative otitis media of left ear without spontaneous rupture of tympanic membrane    2. Congestion of nasal sinus    3. Laryngitis, acute    4. Cough    5. Person under investigation for COVID-19         DISPOSITION/PLAN     Patient Instructions     Will notify you of covid test result as soon as available. You should isolate at home in an area away from family. If you must be around family members, please wear a mask. Quarantine at home until result is available. This means do not go to work/school, attend family gatherings, or invite others to your home until you know your test results. A work/school note will be provided for you. Will send in Augmentin twice daily x 10 days for left ear infection. Take full course of antibiotic even if you are feeling better. Recommend increasing your water intake to help thin secretions and maintain hydration. May try warm salt water gargles, chloraseptic throat spray, or lozenges such as Cepacol sore throat lozenges, for throat pain. Cool beverages and popsicles can help as well. May start flonase nasal spray twice daily to help with sinus symptoms. May use tylenol for fever/body aches/headache. Use cool mist humidifier at bedtime. Use nasal saline flush as needed. Practice good hand hygiene and cover your cough and sneeze to prevent passing virus on. If symptoms worsen or fail to improve, please return to clinic. If you develop severe worsening of symptoms such as chest pain or difficulty breathing, please go to ER. Patient Education        Ear Infection (Otitis Media): Care Instructions  Overview     An ear infection may start with a cold and affect the middle ear (otitis media). It can hurt a lot. Most ear infections clear up on their own in a couple of days and do not need antibiotics. Also, antibiotics do not work against viruses, which may be the cause of your infection. Regular doses of pain relievers are the best way to reduce your fever and help you feel better. Follow-up care is a key part of your treatment and safety. Be sure to make and go to all appointments, and call your doctor if you are having problems. It's also a good idea to know your test results and keep a list of the medicines you take. How can you care for yourself at home? · Take pain medicines exactly as directed. ? If the doctor gave you a prescription medicine for pain, take it as prescribed. ? If you are not taking a prescription pain medicine, take an over-the-counter medicine, such as acetaminophen (Tylenol), ibuprofen (Advil, Motrin), or naproxen (Aleve). Read and follow all instructions on the label. ? Do not take two or more pain medicines at the same time unless the doctor told you to. Many pain medicines have acetaminophen, which is Tylenol. Too much acetaminophen (Tylenol) can be harmful. · Plan to take a full dose of pain reliever before bedtime. Getting enough sleep will help you get better. · Try a warm, moist washcloth on the ear. It may help relieve pain. · If your doctor prescribed antibiotics, take them as directed. Do not stop taking them just because you feel better. You need to take the full course of antibiotics. When should you call for help? Call your doctor now or seek immediate medical care if:    · You have new or increasing ear pain.     · You have new or increasing pus or blood draining from your ear.     · You have a fever with a stiff neck or a severe headache. Watch closely for changes in your health, and be sure to contact your doctor if:    · You have new or worse symptoms.     · You are not getting better after taking an antibiotic for 2 days. Where can you learn more? Go to https://AppThwack.Audanika. org and sign in to your [employer] account. Enter S562 in the [facility] box to learn more about \"Ear Infection (Otitis Media): Care Instructions. \"     If you do not have an account, please click on the \"Sign Up Now\" link. Current as of: April 15, 2020               Content Version: 12.6  © 7084-5128 [employer]. Care instructions adapted under license by [address] If you have questions about a medical condition or this instruction, always ask your healthcare professional. [place] [address] any warranty or liability for your use of this information.          Patient Education Laryngitis: Care Instructions  Your Care Instructions     Laryngitis is an inflammation of the voice box (larynx) that causes your voice to become raspy or hoarse. Most of the time, laryngitis comes on quickly and lasts as long as 2 weeks. It is caused by overuse, irritation, or infection of the vocal cords inside the larynx. Some of the most common causes are a cold, the flu, or allergies. Loud talking, shouting, cheering, or singing also can cause laryngitis. Stomach acid that backs up into the throat also can make you lose your voice. Resting your voice and taking other steps at home can help you get your voice back. Follow-up care is a key part of your treatment and safety. Be sure to make and go to all appointments, and call your doctor if you are having problems. It's also a good idea to know your test results and keep a list of the medicines you take. How can you care for yourself at home? · Rest your voice. You do not have to stop speaking, but use your voice as little as possible. Speak softly but do not whisper; whispering can bother your larynx more than speaking softly. Avoid talking on the telephone or trying to speak loudly. · Drink plenty of water to keep your throat moist.  · Before you use cough and cold medicines, check the label. They may not be safe for young children or for people with certain health problems. · Try to keep stomach acid from backing up into your throat. Do not eat just before bedtime. Reduce the amount of coffee and alcohol you drink, and eat healthy foods. Taking over-the-counter acid reducers can help when these steps are not enough. In some cases, you may need prescription medicine. · Try not to clear your throat. This can cause more irritation of your larynx. Take an over-the-counter cough suppressant (if your doctor recommends it) if you have a dry cough that does not produce mucus. · Use saline (saltwater) nasal washes to help keep your nasal passages open and wash out mucus and bacteria. You can buy saline nose drops at a grocery store or drugstore. Or, you can make your own at home by mixing ½ teaspoon salt, 1 cup water (at room temperature), and ½ teaspoon baking soda. If you make your own, fill a bulb syringe with the solution, insert the tip into your nostril, and squeeze gently. Tamsen Flakes your nose. · Follow your doctor's directions for treating the condition that caused you to lose your voice. If your doctor prescribed antibiotics, take them as directed. Do not stop taking them just because you feel better. You need to take the full course of antibiotics. · Do not smoke or allow others to smoke around you. If you need help quitting, talk to your doctor about stop-smoking programs and medicines. These can increase your chances of quitting for good. When should you call for help? Call 911 anytime you think you may need emergency care. For example, call if:    · You have trouble breathing. Call your doctor now or seek immediate medical care if:    · You have new or worse pain.     · You have trouble swallowing. Watch closely for changes in your health, and be sure to contact your doctor if:    · You do not get better as expected. Where can you learn more? Go to https://Creative AlliespetankExpress Fiteb.Locaid. org and sign in to your [employer] account. Enter I269 in the [employer] box to learn more about \"Laryngitis: Care Instructions. \"     If you do not have an account, please click on the \"Sign Up Now\" link. Current as of: April 15, 2020               Content Version: 12.6  © 8747-9285 [employer] Incorporated. Care instructions adapted under license by [facility] ([facility]). If you have questions about a medical condition or this instruction, always ask your healthcare professional. [address] any warranty or liability for your use of this information. Patient Education        Learning About Coronavirus [phone])  What is coronavirus (COVID-19)? COVID-19 is a disease caused by a new type of coronavirus. This illness was first found in December 2019. It has since spread worldwide. Coronaviruses are a large group of viruses. They cause the common cold. They also cause more serious illnesses like Middle East respiratory syndrome (MERS) and severe acute respiratory syndrome (SARS). COVID-19 is caused by a novel coronavirus. That means it's a new type that has not been seen in people before. What are the symptoms? Coronavirus (COVID-19) symptoms may include:  · Fever. · Cough. · Trouble breathing. · Chills or repeated shaking with chills. · Muscle pain. · Headache. · Sore throat. · New loss of taste or smell. · Vomiting. · Diarrhea. In severe cases, COVID-19 can cause pneumonia and make it hard to breathe without help from a machine. It can cause death. How is it diagnosed? COVID-19 is diagnosed with a viral test. This may also be called a PCR test or antigen test. It looks for evidence of the virus in your breathing passages or lungs (respiratory system). The test is most often done on a sample from the nose, throat, or lungs. It's sometimes done on a sample of saliva. One way a sample is collected is by putting a long swab into the back of your nose. How is it treated? Mild cases of COVID-19 can be treated at home. Serious cases need treatment in the hospital. Treatment may include medicines to reduce symptoms, plus breathing support such as oxygen therapy or a ventilator. Some people may be placed on their belly to help their oxygen levels. Treatments that may help people who have COVID-19 include:  Antiviral medicines. These medicines treat viral infections. Remdesivir is an example. Immune-based therapy. These medicines help the immune system fight COVID-19. One example is bamlanivimab. It's a monoclonal antibody. Blood thinners. These medicines help prevent blood clots. People with severe illness are at risk for blood clots. How can you protect yourself and others? The best way to protect yourself from getting sick is to:  · Avoid areas where there is an outbreak. · Avoid contact with people who may be infected. · Avoid crowds and try to stay at least 6 feet away from other people. · Wash your hands often, especially after you cough or sneeze. Use soap and water, and scrub for at least 20 seconds. If soap and water aren't available, use an alcohol-based hand . · Avoid touching your mouth, nose, and eyes. To help avoid spreading the virus to others:  · Stay home if you are sick or have been exposed to the virus. Don't go to school, work, or public areas. And don't use public transportation, ride-shares, or taxis unless you have no choice. · Wear a cloth face cover if you have to go to public areas. · Cover your mouth with a tissue when you cough or sneeze. Then throw the tissue in the trash and wash your hands right away. · If you're sick:  ? Leave your home only if you need to get medical care. But call the doctor's office first so they know you're coming. And wear a face cover. ? Wear the face cover whenever you're around other people. It can help stop the spread of the virus when you cough or sneeze. ? Limit contact with pets and people in your home. If possible, stay in a separate bedroom and use a separate bathroom. ? Clean and disinfect your home every day. Use household  and disinfectant wipes or sprays. Take special care to clean things that you grab with your hands. These include doorknobs, remote controls, phones, and handles on your refrigerator and microwave. And don't forget countertops, tabletops, bathrooms, and computer keyboards. When should you call for help? Call 911 anytime you think you may need emergency care.  For example, call if you have life-threatening symptoms, such as:   · You have severe trouble breathing. (You can't talk at all.)     · You have constant chest pain or pressure.     · You are severely dizzy or lightheaded.     · You are confused or can't think clearly.     · Your face and lips have a blue color.     · You pass out (lose consciousness) or are very hard to wake up. Call your doctor now or seek immediate medical care if:    · You have moderate trouble breathing. (You can't speak a full sentence.)     · You are coughing up blood (more than about 1 teaspoon).     · You have signs of low blood pressure. These include feeling lightheaded; being too weak to stand; and having cold, pale, clammy skin. Watch closely for changes in your health, and be sure to contact your doctor if:    · Your symptoms get worse.     · You are not getting better as expected. Call before you go to the doctor's office. Follow their instructions. And wear a cloth face cover. Current as of: December 18, 2020               Content Version: 12.7  © 2006-2021 [employer]. Care instructions adapted under license by [facility] ([facility]). If you have questions about a medical condition or this instruction, always ask your healthcare professional. [place] [address] any warranty or liability for your use of this information. Patient Education        Coronavirus ([id number]): Care Instructions  Overview  The coronavirus disease (COVID-19) is caused by a virus. Symptoms may include a fever, a cough, and shortness of breath. It mainly spreads person-to-person through droplets from coughing and sneezing. The virus also can spread when people are in close contact with someone who is infected. Most people have mild symptoms and can take care of themselves at home. If their symptoms get worse, they may need care in a hospital. Treatment may include medicines to reduce symptoms, plus breathing support such as oxygen therapy or a ventilator. It's important to not spread the virus to others. If you have COVID-19, wear a face cover anytime you are around other people. You need to isolate yourself while you are sick. Leave your home only if you need to get medical care or testing. Follow-up care is a key part of your treatment and safety. Be sure to make and go to all appointments, and call your doctor if you are having problems. It's also a good idea to know your test results and keep a list of the medicines you take. How can you care for yourself at home? · Get extra rest. It can help you feel better. · Drink plenty of fluids. This helps replace fluids lost from fever. Fluids also help ease a scratchy throat. Water, soup, fruit juice, and hot tea with lemon are good choices. · Take acetaminophen (such as Tylenol) to reduce a fever. It may also help with muscle aches. Read and follow all instructions on the label. · Use petroleum jelly on sore skin. This can help if the skin around your nose and lips becomes sore from rubbing a lot with tissues. Tips for self-isolation  · Limit contact with people in your home. If possible, stay in a separate bedroom and use a separate bathroom. · Wear a cloth face cover when you are around other people. It can help stop the spread of the virus when you cough or sneeze. · If you have to leave home, avoid crowds and try to stay at least 6 feet away from other people. · Avoid contact with pets and other animals. · Cover your mouth and nose with a tissue when you cough or sneeze. Then throw it in the trash right away. · Wash your hands often, especially after you cough or sneeze. Use soap and water, and scrub for at least 20 seconds. If soap and water aren't available, use an alcohol-based hand . · Don't share personal household items. These include bedding, towels, cups and glasses, and eating utensils. · [address] in the warmest water allowed for the fabric type, and dry it completely. It's okay to wash other people's laundry with yours. · Clean and disinfect your home every day. Use household  and disinfectant wipes or sprays. Take special care to clean things that you grab with your hands. These include doorknobs, remote controls, phones, and handles on your refrigerator and microwave. And don't forget countertops, tabletops, bathrooms, and computer keyboards. When you can end self-isolation  · If you know or suspect that you have COVID-19, stay in self-isolation until:  ? You haven't had a fever for 24 hours while not taking medicines to lower the fever, and  ? Your symptoms have improved, and  ? It's been at least 10 days since your symptoms started. · Talk to your doctor about whether you also need testing, especially if you have a weakened immune system. When should you call for help? Call 911 anytime you think you may need emergency care. For example, call if you have life-threatening symptoms, such as:    · You have severe trouble breathing. (You can't talk at all.)     · You have constant chest pain or pressure.     · You are severely dizzy or lightheaded.     · You are confused or can't think clearly.     · Your face and lips have a blue color.     · You pass out (lose consciousness) or are very hard to wake up. Call your doctor now or seek immediate medical care if:    · You have moderate trouble breathing. (You can't speak a full sentence.)     · You are coughing up blood (more than about 1 teaspoon).     · You have signs of low blood pressure. These include feeling lightheaded; being too weak to stand; and having cold, pale, clammy skin. Watch closely for changes in your health, and be sure to contact your doctor if:    · Your symptoms get worse.     · You are not getting better as expected. Call before you go to the doctor's office. Follow their instructions. And wear a cloth face cover. Current as of: December 18, 2020               Content Version: 12.7  © 2006-2021 Healthwise, Incorporated. Care instructions adapted under license by [facility] ([facility]). If you have questions about a medical condition or this instruction, always ask your healthcare professional. [address] any warranty or liability for your use of this information. Orders Placed This Encounter   Procedures    COVID-19     Standing Status:   Future     Standing Expiration Date:   3/19/2021     Scheduling Instructions:      1) Due to current limited availability of the COVID-19 test, tests will be prioritized based on responses to questions above. Testing may be delayed due to volume. 2) Print and instruct patient to adhere to CDC home isolation program. (Link Above)              3) Set up or refer patient for a monitoring program.              4) Have patient sign up for and leverage MyChart (if not previously done). Order Specific Question:   Is this test for diagnosis or screening? Answer:   Diagnosis of ill patient     Order Specific Question:   Symptomatic for COVID-19 as defined by CDC? Answer:   Yes     Order Specific Question:   Date of Symptom Onset     Answer:   2/15/2021     Order Specific Question:   Hospitalized for COVID-19? Answer:   No     Order Specific Question:   Admitted to ICU for COVID-19? Answer:   No     Order Specific Question:   Employed in healthcare setting? Answer:   No     Order Specific Question:   Resident in a congregate (group) care setting? Answer:   No     Order Specific Question:   Pregnant? Answer:   No     Order Specific Question:   Previously tested for COVID-19?      Answer:   No     Outpatient Encounter Medications as of 2/19/2021   Medication Sig Dispense Refill  amoxicillin-clavulanate (AUGMENTIN) 875-125 MG per tablet Take 1 tablet by mouth 2 times daily for 10 days 20 tablet 0    hydrOXYzine (ATARAX) 25 MG tablet Take 1 tablet by mouth nightly as needed (intersitial cystitis) 90 tablet 1    montelukast (SINGULAIR) 10 MG tablet take 1 tablet by mouth every evening 90 tablet 1    cholestyramine (QUESTRAN) 4 g packet Take 1 packet by mouth daily 30 packet 3    propranolol (INDERAL LA) 60 MG extended release capsule take 1 capsule by mouth once daily 30 capsule 3    buPROPion (WELLBUTRIN XL) 150 MG extended release tablet Take 1 tablet by mouth every morning 90 tablet 3    triamcinolone (KENALOG) 0.025 % cream Apply Topically daily as needed for itching 80 g 1    pantoprazole (PROTONIX) 40 MG tablet take 1 tablet by mouth once daily 30 tablet 5    NONFORMULARY Take 1 capsule by mouth daily Cholesterol 360      Multiple Vitamins-Minerals (PRESERVISION AREDS 2) CHEW Take 1 each by mouth 2 times daily      VITAMIN E PO Take 1 capsule by mouth daily      Ascorbic Acid (VITAMIN C PO) Take 1 tablet by mouth daily      Cholecalciferol (VITAMIN D3 PO) Take 1 tablet by mouth daily      BIOTIN PO Take 1 tablet by mouth daily      FLUoxetine (PROZAC) 40 MG capsule take 1 capsule by mouth once daily 90 capsule 3    albuterol (PROVENTIL) (2.5 MG/3ML) 0.083% nebulizer solution Take 3 mLs by nebulization every 4 hours as needed for Wheezing or Shortness of Breath 25 each 3    Nebulizers (COMPRESSOR/NEBULIZER) MISC Use as directed 1 each 3    CALCIUM-MAGNESIUM-VITAMIN D PO Take by mouth      pseudoephedrine (SUDAFED 12 HR) 120 MG extended release tablet Take 120 mg by mouth every 12 hours      aspirin-acetaminophen-caffeine (EXCEDRIN MIGRAINE) 250-250-65 MG per tablet Take 1 tablet by mouth as needed for Headaches      fluticasone (FLONASE) 50 MCG/ACT nasal spray by Nasal route No  ibuprofen (ADVIL;MOTRIN) 800 MG tablet Take 1 tablet by mouth every 8 hours as needed for Pain 30 tablet 0     No facility-administered encounter medications on file as of 2/19/2021. Return if symptoms worsen or fail to improve.                 Electronically signed by LETITIA Mosqueda CNP on 2/19/2021 at 11:10 AM

## 2022-06-30 NOTE — PROGRESS NOTE ADULT - SUBJECTIVE AND OBJECTIVE BOX
[facility] Division of Hospital Medicine  Elliot Mccormick MD  Pager (SEVEN-F, 8A-5P): [phone]  Other Times:  [phone]    Patient is a 56y old  Male who presents with a chief complaint of ataxia (29 Jun 2022 06:09)    SUBJECTIVE / OVERNIGHT EVENTS:  Patient offers no new complaints at this time.  No F/C, N/V, CP, SOB, Cough, lightheadedness, dizziness, abdominal pain, diarrhea, dysuria.    MEDICATIONS  (STANDING):  aspirin  chewable 81 milliGRAM(s) Oral daily  atorvastatin 40 milliGRAM(s) Oral at bedtime  dextrose 5%. 1000 milliLiter(s) (50 mL/Hr) IV Continuous <Continuous>  dextrose 5%. 1000 milliLiter(s) (100 mL/Hr) IV Continuous <Continuous>  dextrose 50% Injectable 25 Gram(s) IV Push once  dextrose 50% Injectable 12.5 Gram(s) IV Push once  dextrose 50% Injectable 25 Gram(s) IV Push once  enoxaparin Injectable 40 milliGRAM(s) SubCutaneous every 24 hours  glucagon  Injectable 1 milliGRAM(s) IntraMuscular once  hydrochlorothiazide 25 milliGRAM(s) Oral daily  insulin lispro (ADMELOG) corrective regimen sliding scale   SubCutaneous three times a day before meals  insulin lispro (ADMELOG) corrective regimen sliding scale   SubCutaneous at bedtime  metoprolol succinate  milliGRAM(s) Oral daily  valsartan 160 milliGRAM(s) Oral daily    MEDICATIONS  (PRN):  acetaminophen     Tablet .. 650 milliGRAM(s) Oral every 6 hours PRN Temp greater or equal to 38C (100.4F), Mild Pain (1 - 3)  aluminum hydroxide/magnesium hydroxide/simethicone Suspension 30 milliLiter(s) Oral every 4 hours PRN Dyspepsia  dextrose Oral Gel 15 Gram(s) Oral once PRN Blood Glucose LESS THAN 70 milliGRAM(s)/deciliter  melatonin 3 milliGRAM(s) Oral at bedtime PRN Insomnia  ondansetron Injectable 4 milliGRAM(s) IV Push every 8 hours PRN Nausea and/or Vomiting      Vital Signs Last 24 Hrs  T(C): 36.9 (29 Jun 2022 10:41), Max: 37.3 (29 Jun 2022 02:59)  T(F): 98.5 (29 Jun 2022 10:41), Max: 99.1 (29 Jun 2022 02:59)  HR: 85 (29 Jun 2022 10:41) (81 - 105)  BP: 142/86 (29 Jun 2022 10:41) (105/50 - 154/96)  BP(mean): --  RR: 18 (29 Jun 2022 10:41) (16 - 18)  SpO2: 97% (29 Jun 2022 10:41) (97% - 100%)  CAPILLARY BLOOD GLUCOSE      POCT Blood Glucose.: 143 mg/dL (29 Jun 2022 07:16)  POCT Blood Glucose.: 169 mg/dL (28 Jun 2022 19:52)    I&O's Summary      PHYSICAL EXAM:  CONSTITUTIONAL: NAD, obese  EYES: PERRLA; conjunctiva and sclera clear  ENMT: Moist oral mucosa, no pharyngeal injection or exudates; normal dentition  NECK: Supple, no palpable masses; no thyromegaly  RESPIRATORY: Normal respiratory effort; lungs are clear to auscultation bilaterally  CARDIOVASCULAR: Regular rate and rhythm, normal S1 and S2, no murmur/rub/gallop; No lower extremity edema; Peripheral pulses are 2+ bilaterally  ABDOMEN: Nontender to palpation, normoactive bowel sounds, no rebound/guarding; No hepatosplenomegaly  MUSCULOSKELETAL:  Normal gait; no clubbing or cyanosis of digits; no joint swelling or tenderness to palpation  PSYCH: A+O to person, place, and time; affect appropriate  NEUROLOGY: CN 2-12 are intact and symmetric; no gross sensory deficits   SKIN: No rashes; no palpable lesions    LABS:                        16.3   6.72  )-----------( 254      ( 28 Jun 2022 20:58 )             47.9     06-28    134<L>  |  94<L>  |  8   ----------------------------<  162<H>  3.4<L>   |  21<L>  |  0.60    Ca    9.4      28 Jun 2022 20:58  Mg     2.20     06-28    TPro  7.6  /  Alb  4.0  /  TBili  0.8  /  DBili  x   /  AST  36  /  ALT  34  /  AlkPhos  99  06-28    PT/INR - ( 28 Jun 2022 21:20 )   PT: 12.1 sec;   INR: 1.04 ratio         PTT - ( 28 Jun 2022 21:20 )  PTT:30.0 sec          RADIOLOGY & ADDITIONAL TESTS:  < from: MR Head w/wo IV Cont (06.29.22 @ 03:04) >  IMPRESSION: Acute infarct suspected involving the left lenticular   nucleus/corona radiata region with some hemorrhagic transformation   suspected as well. A subacute infarct with peripheral enhancement   suspected involving the right basal ganglia/corona radiata region.   Continued close interval follow-up of both these areas is recommended.    < end of copied text >    Imaging Personally Reviewed:    Care Discussed with Consultants/Other Providers: Neurology -  _________ - discussion of stroke management.   
[facility] Division of Hospital Medicine  Elliot Mccormick MD  Pager (SEVEN-SENDY, 5X-5P): [phone]  Other Times:  [phone]    Patient is a 56y old  Male who presents with a chief complaint of ataxia (29 Jun 2022 11:18)    SUBJECTIVE / OVERNIGHT EVENTS:  Patient offers no new complaints.  No F/C, N/V, CP, SOB, Cough, lightheadedness, dizziness, abdominal pain, diarrhea, dysuria.    MEDICATIONS  (STANDING):  aspirin  chewable 81 milliGRAM(s) Oral daily  atorvastatin 80 milliGRAM(s) Oral at bedtime  clopidogrel Tablet 75 milliGRAM(s) Oral daily  dextrose 5%. 1000 milliLiter(s) (50 mL/Hr) IV Continuous <Continuous>  dextrose 5%. 1000 milliLiter(s) (100 mL/Hr) IV Continuous <Continuous>  dextrose 50% Injectable 25 Gram(s) IV Push once  dextrose 50% Injectable 12.5 Gram(s) IV Push once  dextrose 50% Injectable 25 Gram(s) IV Push once  enoxaparin Injectable 40 milliGRAM(s) SubCutaneous every 24 hours  glucagon  Injectable 1 milliGRAM(s) IntraMuscular once  hydrochlorothiazide 25 milliGRAM(s) Oral daily  insulin lispro (ADMELOG) corrective regimen sliding scale   SubCutaneous three times a day before meals  insulin lispro (ADMELOG) corrective regimen sliding scale   SubCutaneous at bedtime  metoprolol succinate  milliGRAM(s) Oral daily  valsartan 160 milliGRAM(s) Oral daily    MEDICATIONS  (PRN):  acetaminophen     Tablet .. 650 milliGRAM(s) Oral every 6 hours PRN Temp greater or equal to 38C (100.4F), Mild Pain (1 - 3)  aluminum hydroxide/magnesium hydroxide/simethicone Suspension 30 milliLiter(s) Oral every 4 hours PRN Dyspepsia  dextrose Oral Gel 15 Gram(s) Oral once PRN Blood Glucose LESS THAN 70 milliGRAM(s)/deciliter  melatonin 3 milliGRAM(s) Oral at bedtime PRN Insomnia  ondansetron Injectable 4 milliGRAM(s) IV Push every 8 hours PRN Nausea and/or Vomiting      Vital Signs Last 24 Hrs  T(C): 37.1 (30 Jun 2022 09:01), Max: 37.1 (30 Jun 2022 09:01)  T(F): 98.8 (30 Jun 2022 09:01), Max: 98.8 (30 Jun 2022 09:01)  HR: 94 (30 Jun 2022 09:01) (70 - 131)  BP: 136/77 (30 Jun 2022 09:01) (125/60 - 148/82)  BP(mean): --  RR: 18 (30 Jun 2022 09:01) (17 - 18)  SpO2: 98% (30 Jun 2022 09:01) (98% - 100%)  CAPILLARY BLOOD GLUCOSE      POCT Blood Glucose.: 158 mg/dL (30 Jun 2022 09:05)  POCT Blood Glucose.: 139 mg/dL (29 Jun 2022 21:28)  POCT Blood Glucose.: 136 mg/dL (29 Jun 2022 17:51)  POCT Blood Glucose.: 166 mg/dL (29 Jun 2022 12:43)    I&O's Summary      PHYSICAL EXAM:  CONSTITUTIONAL: NAD, obese  EYES: PERRLA; conjunctiva and sclera clear  ENMT: Moist oral mucosa, no pharyngeal injection or exudates; normal dentition  NECK: Supple, no palpable masses; no thyromegaly  RESPIRATORY: Normal respiratory effort; lungs are clear to auscultation bilaterally  CARDIOVASCULAR: Regular rate and rhythm, normal S1 and S2, no murmur/rub/gallop; No lower extremity edema; Peripheral pulses are 2+ bilaterally  ABDOMEN: Nontender to palpation, normoactive bowel sounds, no rebound/guarding; No hepatosplenomegaly  MUSCULOSKELETAL:  Normal gait; no clubbing or cyanosis of digits; no joint swelling or tenderness to palpation  PSYCH: A+O to person, place, and time; affect appropriate  NEUROLOGY: CN 2-12 are intact and symmetric; no gross sensory deficits   SKIN: No rashes; no palpable lesions    LABS:                        14.4   9.26  )-----------( 266      ( 30 Jun 2022 06:00 )             44.1     06-30    137  |  101  |  28<H>  ----------------------------<  162<H>  3.8   |  25  |  0.57    Ca    9.0      30 Jun 2022 06:00  Phos  2.5     06-30  Mg     1.90     06-30    TPro  7.6  /  Alb  4.0  /  TBili  0.8  /  DBili  x   /  AST  36  /  ALT  34  /  AlkPhos  99  06-28    PT/INR - ( 28 Jun 2022 21:20 )   PT: 12.1 sec;   INR: 1.04 ratio         PTT - ( 28 Jun 2022 21:20 )  PTT:30.0 sec          RADIOLOGY & ADDITIONAL TESTS:    Imaging Personally Reviewed:    Care Discussed with Consultants/Other Providers:

## 2022-06-30 NOTE — PROGRESS NOTE ADULT - PROBLEM SELECTOR PLAN 1
acute CVA noted on MR Brain  - CTA of Head neck reviewed  - TTE with bubble study reviewed  - As per Neuro - no ILR  - Continue ASA, lipitor - would inquire neuro about plavix  - PT/OT eval for safe disposition - but patien tis ambulating without difficulties  - Lovenox SC for VTE ppx  - appreciate neuro consult

## 2022-06-30 NOTE — PATIENT PROFILE ADULT - FALL HARM RISK - RISK INTERVENTIONS

## 2022-06-30 NOTE — PROGRESS NOTE ADULT - NSPROGADDITIONALINFOA_GEN_ALL_CORE
no Patient medically optimized for discharge.  Discharge planning 40 minutes - discussed with patient and consultants.

## 2022-06-30 NOTE — PROGRESS NOTE ADULT - PROBLEM SELECTOR PLAN 3
Continue HCTZ, Toprol XL, Valsartan with hold parameter  may need CCB instead of HCTZ if BP continues to be elevated - consider Procardia XL  monitor BP
Continue HCTZ, Toprol XL, Valsartan with hold parameter  may need CCB instead of HCTZ if BP continues to be elevated - consider Procardia XL  monitor BP

## 2022-06-30 NOTE — PATIENT PROFILE ADULT - FALL HARM RISK - HARM RISK INTERVENTIONS

## 2022-06-30 NOTE — DISCHARGE NOTE PROVIDER - CARE PROVIDER_API CALL
Libman, Richard B (MD)  Neurology; Vascular Neurology  [address] [facility], [facility] [address]  [place] [address]  Phone: [phone]  Fax: [phone]  Follow Up Time: 2 weeks

## 2022-07-01 RX ORDER — CLOPIDOGREL BISULFATE 75 MG/1
1 TABLET, FILM COATED ORAL
Qty: 19 | Refills: 0
Start: 2022-07-01 | End: 2022-07-19

## 2022-07-04 LAB — VIT B1 SERPL-MCNC: 121.6 NMOL/L — SIGNIFICANT CHANGE UP (ref 66.5–200)

## 2022-07-06 LAB — PYRIDOXAL PHOS SERPL-MCNC: 13.2 UG/L — SIGNIFICANT CHANGE UP (ref 3.4–65.2)

## 2022-07-20 PROBLEM — I10 ESSENTIAL (PRIMARY) HYPERTENSION: Chronic | Status: ACTIVE | Noted: 2022-06-29

## 2022-07-20 PROBLEM — Z00.00 ENCOUNTER FOR PREVENTIVE HEALTH EXAMINATION: Status: ACTIVE | Noted: 2022-07-20

## 2022-07-27 ENCOUNTER — APPOINTMENT (OUTPATIENT)
Dept: NEUROLOGY | Facility: CLINIC | Age: 57
End: 2022-07-27

## 2022-07-27 VITALS
SYSTOLIC BLOOD PRESSURE: 169 MMHG | BODY MASS INDEX: 37.89 KG/M2 | HEIGHT: 68 IN | WEIGHT: 250 LBS | DIASTOLIC BLOOD PRESSURE: 93 MMHG | HEART RATE: 108 BPM

## 2022-07-27 DIAGNOSIS — I63.9 CEREBRAL INFARCTION, UNSPECIFIED: ICD-10-CM

## 2022-07-27 DIAGNOSIS — Z82.3 FAMILY HISTORY OF STROKE: ICD-10-CM

## 2022-07-27 PROCEDURE — 99214 OFFICE O/P EST MOD 30 MIN: CPT

## 2022-07-27 RX ORDER — 70%ISOPROPYL ALCOHOL 0.7 ML/ML
70 SWAB TOPICAL
Qty: 100 | Refills: 0 | Status: ACTIVE | COMMUNITY
Start: 2022-06-30

## 2022-07-27 RX ORDER — ATORVASTATIN CALCIUM 80 MG/1
80 TABLET, FILM COATED ORAL
Qty: 30 | Refills: 0 | Status: ACTIVE | COMMUNITY
Start: 2022-06-30

## 2022-07-27 RX ORDER — BLOOD SUGAR DIAGNOSTIC
STRIP MISCELLANEOUS
Qty: 100 | Refills: 0 | Status: ACTIVE | COMMUNITY
Start: 2022-06-30

## 2022-07-27 RX ORDER — CLOPIDOGREL BISULFATE 75 MG/1
75 TABLET, FILM COATED ORAL
Qty: 19 | Refills: 0 | Status: ACTIVE | COMMUNITY
Start: 2022-06-30

## 2022-07-27 RX ORDER — AMLODIPINE BESYLATE 10 MG/1
10 TABLET ORAL
Qty: 30 | Refills: 0 | Status: ACTIVE | COMMUNITY
Start: 2022-07-13

## 2022-07-27 RX ORDER — METFORMIN HYDROCHLORIDE 1000 MG/1
1000 TABLET, COATED ORAL
Qty: 60 | Refills: 0 | Status: ACTIVE | COMMUNITY
Start: 2022-06-30

## 2022-07-27 RX ORDER — BLOOD-GLUCOSE METER
W/DEVICE KIT MISCELLANEOUS
Qty: 1 | Refills: 0 | Status: ACTIVE | COMMUNITY
Start: 2022-06-30

## 2022-07-27 RX ORDER — LANCETS
EACH MISCELLANEOUS
Qty: 100 | Refills: 0 | Status: ACTIVE | COMMUNITY
Start: 2022-06-30

## 2022-07-27 RX ORDER — METOPROLOL SUCCINATE 200 MG/1
200 TABLET, EXTENDED RELEASE ORAL
Qty: 30 | Refills: 0 | Status: ACTIVE | COMMUNITY
Start: 2022-02-22

## 2022-07-27 RX ORDER — VALSARTAN AND HYDROCHLOROTHIAZIDE 160; 25 MG/1; MG/1
160-25 TABLET, FILM COATED ORAL
Qty: 30 | Refills: 0 | Status: ACTIVE | COMMUNITY
Start: 2022-02-23

## 2022-07-27 RX ORDER — ASPIRIN 81 MG/1
81 TABLET, CHEWABLE ORAL
Qty: 30 | Refills: 0 | Status: ACTIVE | COMMUNITY
Start: 2022-06-30

## 2022-12-19 ENCOUNTER — APPOINTMENT (OUTPATIENT)
Dept: NEUROLOGY | Facility: CLINIC | Age: 57
End: 2022-12-19

## 2024-04-11 NOTE — PHYSICAL THERAPY INITIAL EVALUATION ADULT - IMPAIRMENTS FOUND, PT EVAL
Christy Oconnell of normal stress test results. He did not have any additional questions.   
Stress test is normal  
gait, locomotion, and balance/muscle strength
